# Patient Record
Sex: MALE | Race: WHITE | NOT HISPANIC OR LATINO | URBAN - METROPOLITAN AREA
[De-identification: names, ages, dates, MRNs, and addresses within clinical notes are randomized per-mention and may not be internally consistent; named-entity substitution may affect disease eponyms.]

---

## 2018-10-18 ENCOUNTER — OUTPATIENT (OUTPATIENT)
Dept: OUTPATIENT SERVICES | Facility: HOSPITAL | Age: 53
LOS: 1 days | End: 2018-10-18
Payer: COMMERCIAL

## 2018-10-18 VITALS
SYSTOLIC BLOOD PRESSURE: 110 MMHG | TEMPERATURE: 98 F | DIASTOLIC BLOOD PRESSURE: 68 MMHG | HEART RATE: 65 BPM | HEIGHT: 67.5 IN | WEIGHT: 132.94 LBS | OXYGEN SATURATION: 98 % | RESPIRATION RATE: 14 BRPM

## 2018-10-18 DIAGNOSIS — S83.249A OTHER TEAR OF MEDIAL MENISCUS, CURRENT INJURY, UNSPECIFIED KNEE, INITIAL ENCOUNTER: ICD-10-CM

## 2018-10-18 DIAGNOSIS — Z98.890 OTHER SPECIFIED POSTPROCEDURAL STATES: Chronic | ICD-10-CM

## 2018-10-18 LAB
BUN SERPL-MCNC: 16 MG/DL — SIGNIFICANT CHANGE UP (ref 7–23)
CALCIUM SERPL-MCNC: 9.3 MG/DL — SIGNIFICANT CHANGE UP (ref 8.4–10.5)
CHLORIDE SERPL-SCNC: 101 MMOL/L — SIGNIFICANT CHANGE UP (ref 98–107)
CO2 SERPL-SCNC: 28 MMOL/L — SIGNIFICANT CHANGE UP (ref 22–31)
CREAT SERPL-MCNC: 1.05 MG/DL — SIGNIFICANT CHANGE UP (ref 0.5–1.3)
GLUCOSE SERPL-MCNC: 95 MG/DL — SIGNIFICANT CHANGE UP (ref 70–99)
HCT VFR BLD CALC: 45.5 % — SIGNIFICANT CHANGE UP (ref 39–50)
HGB BLD-MCNC: 15.1 G/DL — SIGNIFICANT CHANGE UP (ref 13–17)
MCHC RBC-ENTMCNC: 28 PG — SIGNIFICANT CHANGE UP (ref 27–34)
MCHC RBC-ENTMCNC: 33.2 % — SIGNIFICANT CHANGE UP (ref 32–36)
MCV RBC AUTO: 84.4 FL — SIGNIFICANT CHANGE UP (ref 80–100)
NRBC # FLD: 0 — SIGNIFICANT CHANGE UP
PLATELET # BLD AUTO: 175 K/UL — SIGNIFICANT CHANGE UP (ref 150–400)
PMV BLD: 10.2 FL — SIGNIFICANT CHANGE UP (ref 7–13)
POTASSIUM SERPL-MCNC: 4.1 MMOL/L — SIGNIFICANT CHANGE UP (ref 3.5–5.3)
POTASSIUM SERPL-SCNC: 4.1 MMOL/L — SIGNIFICANT CHANGE UP (ref 3.5–5.3)
RBC # BLD: 5.39 M/UL — SIGNIFICANT CHANGE UP (ref 4.2–5.8)
RBC # FLD: 13.3 % — SIGNIFICANT CHANGE UP (ref 10.3–14.5)
SODIUM SERPL-SCNC: 141 MMOL/L — SIGNIFICANT CHANGE UP (ref 135–145)
WBC # BLD: 7.54 K/UL — SIGNIFICANT CHANGE UP (ref 3.8–10.5)
WBC # FLD AUTO: 7.54 K/UL — SIGNIFICANT CHANGE UP (ref 3.8–10.5)

## 2018-10-18 PROCEDURE — 93010 ELECTROCARDIOGRAM REPORT: CPT

## 2018-10-18 NOTE — H&P PST ADULT - ASSESSMENT
Other tear of medial meniscus, current injury, unspecified knee, initial encouter- Patient is scheduled for left knee arthroscopy scheduled on 10/30/2018.

## 2018-10-18 NOTE — H&P PST ADULT - NEGATIVE ALLERGY TYPES
no reactions to medicines/no outdoor environmental allergies/no indoor environmental allergies/no reactions to food/no reactions to insect bites/no reactions to animals

## 2018-10-18 NOTE — H&P PST ADULT - PROBLEM SELECTOR PLAN 1
Patient is scheduled for left knee arthroscopy scheduled on 10/30/2018.   Preop instructions, pepcid, surgical scrub provided. Pt stated understanding.  Pending labs.      MILI precautions, OR booking notified.

## 2018-10-18 NOTE — H&P PST ADULT - CIGARS, DURATION OF USE (MO/YR), PROFILE
Patient calling to report that he has a nonfunctioning Group 2 air mattress that [facility] to [facility]. He wanted documented in his chart in case the [facility] calls asking for our recommendations about what device he will need ordered that he does not want another Group 2. He has an adjustable height electric hospital bed with trapeze and would like to use a memory foam pressure relieving mattress    22 years

## 2018-10-18 NOTE — H&P PST ADULT - MUSCULOSKELETAL
details… detailed exam ROM intact/no joint warmth/no calf tenderness/no joint swelling/no joint erythema/normal strength

## 2018-10-18 NOTE — H&P PST ADULT - GASTROINTESTINAL DETAILS
no distention/no rebound tenderness/no guarding/no masses palpable/bowel sounds normal/soft/nontender

## 2018-10-18 NOTE — H&P PST ADULT - NEGATIVE MUSCULOSKELETAL SYMPTOMS
no muscle cramps/no muscle weakness/no neck pain/no leg pain R/no back pain/no leg pain L/no arm pain L/no arm pain R

## 2018-10-18 NOTE — H&P PST ADULT - NEGATIVE OPHTHALMOLOGIC SYMPTOMS
no irritation R/no blurred vision L/no pain R/no loss of vision L/no discharge L/no irritation L/no loss of vision R/no blurred vision R/no pain L/no diplopia/no photophobia/no discharge R

## 2018-10-18 NOTE — H&P PST ADULT - NEGATIVE GENERAL GENITOURINARY SYMPTOMS
normal urinary frequency/no nocturia/no urinary hesitancy/no hematuria/no urine discoloration/no bladder infections/no dysuria/no flank pain R/no flank pain L/no incontinence

## 2018-10-18 NOTE — H&P PST ADULT - NEGATIVE ENMT SYMPTOMS
no hearing difficulty/no ear pain/no abnormal taste sensation/no dysphagia/no tinnitus/no sinus symptoms/no nasal congestion/no nasal obstruction/no nose bleeds/no recurrent cold sores/no dry mouth/no vertigo/no gum bleeding/no throat pain/no nasal discharge/no post-nasal discharge

## 2018-10-18 NOTE — H&P PST ADULT - PMH
Basal cell carcinoma  Multiple areas for the past 10-12 years. Being followed by Dr. Almaraz.  Other tear of medial meniscus, current injury, unspecified knee, initial encounter

## 2018-10-18 NOTE — H&P PST ADULT - HISTORY OF PRESENT ILLNESS
53 year old male with a history of Basal cell Carcinoma, presents with complaints of Left knee pain since August 2018. Patient was referred to Dr. Crowder for an evaluation. Patient is s/p MRI of the left knee, resulted in tear of medial meniscus of the left knee. Patient is scheduled for left knee arthroscopy scheduled on 10/30/2018.

## 2018-10-18 NOTE — H&P PST ADULT - RS GEN PE MLT RESP DETAILS PC
breath sounds equal/respirations non-labored/good air movement/no rales/no wheezes/no rhonchi/clear to auscultation bilaterally/airway patent

## 2018-10-18 NOTE — H&P PST ADULT - NEGATIVE GENERAL SYMPTOMS
no sweating/no weight gain/no fever/no anorexia/no weight loss/no fatigue/no polyphagia/no polyuria/no polydipsia/no chills

## 2018-11-05 ENCOUNTER — TRANSCRIPTION ENCOUNTER (OUTPATIENT)
Age: 53
End: 2018-11-05

## 2018-11-06 ENCOUNTER — OUTPATIENT (OUTPATIENT)
Dept: OUTPATIENT SERVICES | Facility: HOSPITAL | Age: 53
LOS: 1 days | Discharge: HOME | End: 2018-11-06

## 2018-11-06 VITALS
HEIGHT: 67.5 IN | DIASTOLIC BLOOD PRESSURE: 77 MMHG | HEART RATE: 65 BPM | OXYGEN SATURATION: 98 % | WEIGHT: 132.94 LBS | TEMPERATURE: 97 F | SYSTOLIC BLOOD PRESSURE: 139 MMHG | RESPIRATION RATE: 16 BRPM

## 2018-11-06 VITALS — TEMPERATURE: 98 F

## 2018-11-06 DIAGNOSIS — Z98.890 OTHER SPECIFIED POSTPROCEDURAL STATES: Chronic | ICD-10-CM

## 2018-11-06 DIAGNOSIS — S83.249A OTHER TEAR OF MEDIAL MENISCUS, CURRENT INJURY, UNSPECIFIED KNEE, INITIAL ENCOUNTER: ICD-10-CM

## 2018-11-06 RX ORDER — ASPIRIN/CALCIUM CARB/MAGNESIUM 324 MG
81 TABLET ORAL ONCE
Qty: 0 | Refills: 0 | Status: DISCONTINUED | OUTPATIENT
Start: 2018-11-06 | End: 2018-11-21

## 2018-11-06 RX ORDER — IBUPROFEN 200 MG
600 TABLET ORAL EVERY 6 HOURS
Qty: 0 | Refills: 0 | Status: DISCONTINUED | OUTPATIENT
Start: 2018-11-06 | End: 2018-11-21

## 2018-11-06 RX ORDER — SODIUM CHLORIDE 9 MG/ML
1000 INJECTION INTRAMUSCULAR; INTRAVENOUS; SUBCUTANEOUS
Qty: 0 | Refills: 0 | Status: DISCONTINUED | OUTPATIENT
Start: 2018-11-06 | End: 2018-11-21

## 2018-11-06 RX ORDER — OXYCODONE AND ACETAMINOPHEN 5; 325 MG/1; MG/1
1 TABLET ORAL ONCE
Qty: 0 | Refills: 0 | Status: DISCONTINUED | OUTPATIENT
Start: 2018-11-06 | End: 2018-11-06

## 2018-11-06 RX ORDER — ASPIRIN/CALCIUM CARB/MAGNESIUM 324 MG
1 TABLET ORAL
Qty: 0 | Refills: 0 | DISCHARGE
Start: 2018-11-06

## 2018-11-06 RX ORDER — IBUPROFEN 200 MG
1 TABLET ORAL
Qty: 0 | Refills: 0 | DISCHARGE
Start: 2018-11-06

## 2018-11-06 NOTE — ASU DISCHARGE PLAN (ADULT/PEDIATRIC). - SPECIAL INSTRUCTIONS
Take Percocet for severe pain only. Can take up to 6 Tylenol 325 mg a day while taking Percocet. Alternate between taking Ibuprofen and Tylenol so you are taking pain medication every 3-4 hours if your pain is severe.    Narcotic pain medicine can cause extreme nausea and constipation. Drink plenty of water and take diuretics (colace, Miralax) as needed. You can get them from your local pharmacy.    It is important to ice and elevate your leg to keep swelling down and the pain manageable. Keep the ice on for 20 minutes, and then keep off for 20 minutes. Repeat while awake.

## 2018-11-06 NOTE — ASU DISCHARGE PLAN (ADULT/PEDIATRIC). - NOTIFY
Fever greater than 101/Pain not relieved by Medications/Numbness, color, or temperature change to extremity/Persistent Nausea and Vomiting/Bleeding that does not stop Bleeding that does not stop/Fever greater than 101/Persistent Nausea and Vomiting/Signs of infection: redness around surgical site, hot and tender to the touch, pus drainage of any kind accompanied by foul odor with a temperature of 101.0, or higher./Pain not relieved by Medications/Numbness, color, or temperature change to extremity

## 2018-11-06 NOTE — ASU DISCHARGE PLAN (ADULT/PEDIATRIC). - INSTRUCTIONS
Start with clear liquids and gradually increase your diet as you can, until you return to your normal diet. Do not eat too much too fast for today. Start with small meals, and what you can tolerate. Avoid eating anything greasy or spicy for today to avoid nausea. You may not have an appetite today, and that is normal due to anesthesia but drink plenty of fluids throughout the day.  You may or may not experience a sore throat after surgery. If you do experience a sore throat, it is likely from being intubated in the operating room. Sore throat will reside in 24-48 hours. You may drink room temperature fluids or use throat lozenges for comfort until sore throat resides.

## 2018-11-06 NOTE — BRIEF OPERATIVE NOTE - PROCEDURE
<<-----Click on this checkbox to enter Procedure Arthroscopic debridement of meniscus of left knee  11/06/2018    Active  [facility]

## 2018-11-06 NOTE — ASU DISCHARGE PLAN (ADULT/PEDIATRIC). - ACTIVITY LEVEL
weight bearing as tolerated/no sports/gym/no heavy lifting/elevate extremity weight bearing as tolerated/no sports/gym/no heavy lifting/Nothing heavier than 10 pounds./elevate extremity

## 2018-11-06 NOTE — ASU DISCHARGE PLAN (ADULT/PEDIATRIC). - BATHING
shower only After 24 hours until follow up with MD. Avoid tub baths/submerging incision in pool of water/shower only

## 2018-11-06 NOTE — ASU DISCHARGE PLAN (ADULT/PEDIATRIC). - NURSING INSTRUCTIONS
You were given IV Tylenol (1000mg) for pain management in the Operating Room.  Please do NOT take tylenol/acetaminophen products (Percocet, Vicodin, Excedrin) for the next 6-8 hours (after  _______ ). When taking pain/narcotic medications - take with food as it can cause nausea if taken on an empty stomach, and know it may cause constipation. To prevent constipation increase fluids and fiber in diet. You may take stool softeners (such as Colace) and follow directions as printed on bottle. - Do NOT drive while on narcotics.   You were given IV Toradol for pain management in the Operating Room. Please do NOT take Ibuprofen (NSAIDS) products (Motrin, Aleve, Advil) for the next 6-8 hours (After  _______). Please take ibuprophen with food to avoid gastrointestinal issues.   You were given IV antibiotics in the OR. IF you are prescribed oral antibiotics to take at home. Please follow directions on the bottle and start your first dose of antibiotics before bedtime, and schedule your own timing until you finish all antibiotics. DO NOT DISCONTINUE ON YOUR OWN.  Refer to medication reconcillation sheet attached with discharge instruction paperwork. You were given IV antibiotics in the OR. IF you are prescribed oral antibiotics to take at home. Please follow directions on the bottle and start your first dose of antibiotics before bedtime, and schedule your own timing until you finish all antibiotics. DO NOT DISCONTINUE ON YOUR OWN.   You were given oxycodone in the pre-op room prior to surgery. Next dose of any medication with oxycodone is 400PM.  When taking pain/narcotic medications - take with food as it can cause nausea if taken on an empty stomach, and know it may cause constipation. To prevent constipation increase fluids and fiber in diet. You may take stool softeners (such as Colace) and follow directions as printed on bottle. - Do NOT drive while on narcotics.   Refer to medication reconcillation sheet attached with discharge instruction paperwork.

## 2019-04-15 PROBLEM — C44.91 BASAL CELL CARCINOMA OF SKIN, UNSPECIFIED: Chronic | Status: ACTIVE | Noted: 2018-10-18

## 2019-04-15 PROBLEM — S83.249A OTHER TEAR OF MEDIAL MENISCUS, CURRENT INJURY, UNSPECIFIED KNEE, INITIAL ENCOUNTER: Chronic | Status: ACTIVE | Noted: 2018-10-18

## 2019-04-28 PROBLEM — Z00.00 ENCOUNTER FOR PREVENTIVE HEALTH EXAMINATION: Status: ACTIVE | Noted: 2019-04-28

## 2019-05-28 ENCOUNTER — APPOINTMENT (OUTPATIENT)
Dept: GASTROENTEROLOGY | Facility: CLINIC | Age: 54
End: 2019-05-28
Payer: COMMERCIAL

## 2019-05-28 VITALS
HEART RATE: 71 BPM | SYSTOLIC BLOOD PRESSURE: 129 MMHG | WEIGHT: 138 LBS | HEIGHT: 68 IN | BODY MASS INDEX: 20.92 KG/M2 | DIASTOLIC BLOOD PRESSURE: 77 MMHG

## 2019-05-28 DIAGNOSIS — Z85.828 PERSONAL HISTORY OF OTHER MALIGNANT NEOPLASM OF SKIN: ICD-10-CM

## 2019-05-28 DIAGNOSIS — Z12.11 ENCOUNTER FOR SCREENING FOR MALIGNANT NEOPLASM OF COLON: ICD-10-CM

## 2019-05-28 DIAGNOSIS — Z78.9 OTHER SPECIFIED HEALTH STATUS: ICD-10-CM

## 2019-05-28 DIAGNOSIS — Z80.7 FAMILY HISTORY OF OTHER MALIGNANT NEOPLASMS OF LYMPHOID, HEMATOPOIETIC AND RELATED TISSUES: ICD-10-CM

## 2019-05-28 PROCEDURE — 99244 OFF/OP CNSLTJ NEW/EST MOD 40: CPT

## 2019-05-28 NOTE — CONSULT LETTER
[Dear  ___] : Dear  [unfilled], [Sincerely,] : Sincerely, [Consult Letter:] : I had the pleasure of evaluating your patient, [unfilled]. [Please see my note below.] : Please see my note below. [FreeTextEntry3] : Chin Smith MD\par Department of Gastroenterology\par [facility]\par [address] [place] [address], [facility] [address]\[place] [address]\par Tel: [phone]\par Email: [email or web address]

## 2019-05-28 NOTE — HISTORY OF PRESENT ILLNESS
[Heartburn] : denies heartburn [Nausea] : denies nausea [Vomiting] : denies vomiting [Diarrhea] : denies diarrhea [Constipation] : denies constipation [Abdominal Swelling] : denies abdominal swelling [Abdominal Pain] : denies abdominal pain [Yellow Skin Or Eyes (Jaundice)] : denies jaundice [Rectal Pain] : denies rectal pain [de-identified] : Kalin presents to the office today for evaluation for colon cancer screening.  \par \par He feels well from a GI perspective, and denies any dysphagia, nausea, abdominal pain, change in bowel habits, GI bleeding, weight loss, or family history of colon cancer.  He normally moves his bowels once a day.  He has never had a colonoscopy before.  Both his parents  from lymphoma.  He smokes cigars daily.\par

## 2019-05-28 NOTE — ASSESSMENT
[FreeTextEntry1] : 1.  Encounter for colon cancer screening in average risk patient.  No previous colonoscopy.\par 2.  History of basal and squamous cell skin cancers status post excision.\par 3.  Systolic heart murmur.\par 4.  Hypercholesterolemia.\par 5.  Status post right knee surgery.\par 6.  Cigar smoker (daily).\par \par Recs:\par - Recent labs reviewed. CBC, CMP unremarkable.\par - Patient was advised to undergo colonoscopy- procedure, risks, benefits, and alternatives were explained. Patient agreeable. Brochure given. Miralax prep.\par - The patient was advised on cessation of tobacco products.\par - Low fat diet.

## 2019-06-21 ENCOUNTER — APPOINTMENT (OUTPATIENT)
Dept: GASTROENTEROLOGY | Facility: CLINIC | Age: 54
End: 2019-06-21
Payer: COMMERCIAL

## 2019-06-21 ENCOUNTER — LABORATORY RESULT (OUTPATIENT)
Age: 54
End: 2019-06-21

## 2019-06-21 PROCEDURE — 45380 COLONOSCOPY AND BIOPSY: CPT | Mod: 33

## 2020-08-10 ENCOUNTER — APPOINTMENT (OUTPATIENT)
Dept: CARDIOTHORACIC SURGERY | Facility: CLINIC | Age: 55
End: 2020-08-10
Payer: COMMERCIAL

## 2020-08-10 VITALS
SYSTOLIC BLOOD PRESSURE: 137 MMHG | HEIGHT: 68 IN | HEART RATE: 64 BPM | RESPIRATION RATE: 18 BRPM | OXYGEN SATURATION: 99 % | WEIGHT: 140 LBS | DIASTOLIC BLOOD PRESSURE: 86 MMHG | BODY MASS INDEX: 21.22 KG/M2 | TEMPERATURE: 97.9 F

## 2020-08-10 DIAGNOSIS — Z78.9 OTHER SPECIFIED HEALTH STATUS: ICD-10-CM

## 2020-08-10 PROCEDURE — 99244 OFF/OP CNSLTJ NEW/EST MOD 40: CPT

## 2020-08-10 NOTE — HISTORY OF PRESENT ILLNESS
[FreeTextEntry1] : Mr. Phoenix is a 55 year old male current everyday cigar smoker referred by Dr. Harman here today for initial evaluation of Severe Aortic Stenosis. Past medical history significant for Basal cell Carcinoma, Hyperlipidemia, Heart Murmur, Cervical Disc Disease, Microhematuria, Squamous Cell Carcinoma, and Severe Aortic Stenosis. Today he reports fatigue, shortness of breath and decreased energy level that has gotten worse over the past few months. He denies any chest pain, dizziness, palpitations, fevers, chills, nausea, vomiting, diarrhea, unintentional weight loss.\par \par The patient's past medical history, past surgical history, family history, social history, allergies, medications, and multisystem review of systems were individually reviewed with the patient.  There are no pertinent additions or subtractions.  The patient was personally seen and examined.\par

## 2020-08-10 NOTE — REVIEW OF SYSTEMS
[Feeling Poorly] : not feeling poorly [Feeling Tired] : not feeling tired [Eyesight Problems] : no eyesight problems [Discharge From Eyes] : no purulent discharge from the eyes [Nosebleeds] : no nosebleeds [Nasal Discharge] : no nasal discharge [Chest Pain] : no chest pain [Palpitations] : no palpitations [Cough] : no cough [SOB on Exertion] : no shortness of breath during exertion [Constipation] : no constipation [Diarrhea] : no diarrhea [Hesitancy] : no urinary hesitancy [Nocturia] : no nocturia [Joint Swelling] : no joint swelling [Joint Stiffness] : no joint stiffness [Itching] : no itching [Change In A Mole] : no change in a mole [Dizziness] : no dizziness [Fainting] : no fainting [Anxiety] : no anxiety [Depression] : no depression [Muscle Weakness] : no muscle weakness [Erectile Dysfunction] : no erectile dysfunction [Swollen Glands In The Neck] : no swollen glands in the neck [Swollen Glands] : no swollen glands [Negative] : Heme/Lymph

## 2020-08-10 NOTE — DATA REVIEWED
[FreeTextEntry1] : Echocardiogram performed on 07/06/2020 revealed at \par the aortic valve shows severe calcification with severe restriction. There is severe aortic valve stenosis. The peak velocity across the aortic valve is 3.9 m/s, the peak pressure gradient is 61 mmHg and the mean pressure gradient is 34 mmHg. The estimated aortic valve area is 0.80 cm2.\par

## 2020-08-10 NOTE — CONSULT LETTER
[Courtesy Letter:] : I had the pleasure of seeing your patient, [unfilled], in my office today. [Dear  ___] : Dear  [unfilled], [Referral Closing:] : Thank you very much for seeing this patient.  If you have any questions, please do not hesitate to contact me. [Sincerely,] : Sincerely, [Please see my note below.] : Please see my note below. [FreeTextEntry2] : Dr Harman [FreeTextEntry3] : Carlos Enrique Cruz MD\par Chief of Cardiovascular & Thoracic Surgery\par System Director of Endovascular & Cardiovascular Surgery\par \par Cardiovascular & Thoracic Surgery\par [facility]\par \par [facility] \par [address] \[place] [address]\par Tel   [phone]\par Fax  [phone]\par

## 2020-08-10 NOTE — PHYSICAL EXAM
[General Appearance - Alert] : alert [Sclera] : the sclera and conjunctiva were normal [Extraocular Movements] : extraocular movements were intact [General Appearance - Well Nourished] : well nourished [Neck Appearance] : the appearance of the neck was normal [Hearing Threshold Finger Rub Not [place]] : hearing was normal [Outer Ear] : the ears and nose were normal in appearance [Neck Cervical Mass (___cm)] : no neck mass was observed [Apical Impulse] : the apical impulse was normal [Exaggerated Use Of Accessory Muscles For Inspiration] : no accessory muscle use [Examination Of The Chest] : the chest was normal in appearance [2+] : left 2+ [Breast Palpation Mass] : no palpable masses [Bowel Sounds] : normal bowel sounds [Breast Appearance] : normal in appearance [Cervical Lymph Nodes Enlarged Posterior Bilaterally] : posterior cervical [Abdomen Tenderness] : non-tender [No CVA Tenderness] : no ~M costovertebral angle tenderness [Supraclavicular Lymph Nodes Enlarged Bilaterally] : supraclavicular [Abnormal Walk] : normal gait [Nail Clubbing] : no clubbing  or cyanosis of the fingernails [Skin Color & Pigmentation] : normal skin color and pigmentation [] : no rash [Sensation] : the sensory exam was normal to light touch and pinprick [Cranial Nerves] : cranial nerves 2-12 were intact [FreeTextEntry1] : defer

## 2020-08-10 NOTE — ASSESSMENT
[FreeTextEntry1] : The patient is a very pleasant 56 rule male. His cardiovascular risk factors include hypertension, hyperlipidemia, and prior tobacco abuse. The patient reports no longer smoking cigarettes, does smoke cigars daily. He states that he "tries not to inhaled." The patient was referred to Dr. Mayo with a heart murmur one year ago. Echocardiogram showed moderate aortic stenosis at that time. Repeat echocardiogram July 6, 2020 now show severe aortic valve stenosis with preserved ventricular function. The patient denies exertional dyspnea, the fascia reports feeling much more fatigued and tired. Denies chest pain. He denies a known history of bicuspid aortic valve disease. Furthermore, he denies family history of aortic aneurysm, ascending aortic dissection, or unexplained sudden death. He has no stigmata of Marfan's. He is now referred for consideration for aortic valve replacement.\par \par At 55 years of age, the patient is not a candidate for TAVR.  The patient did some investigating, and he was fully aware of this fact. Aortic valve replacement is indicated. Additional workup will include cardiac catheterization along with CTA of the chest to evaluate the ascending aorta and aortic root dimensions.\par \par Risks benefits and alternatives to surgical aortic valve replacement were discussed with the patient in detail.  Risks discussed included, but not limited to infection, bleeding, myocardial infarction, cerebrovascular accident, renal failure,  vascular injury requiring intervention, and death.  The patient fully understood and wishes to proceed.  All questions were answered to the patient's understanding and satisfaction.  We also discussed the benefits, alternatives, and disadvantages of both bioprosthetic and mechanical prostheses.  The patient chose a biologic prosthesis.  Furthermore, the patient's STS score and its significance were discussed directly with the patient and family.\par I would like to thank you for referring this patient to my attention and for allowing me to participate in his care.  If there are any further questions, or I can be of any further assistance, please do not hesitate contacting me at any time.\par \par \par PLAN:\par CT scan of the chest to measure and further evaluate the aorta valve\par [facility] cardiac Catherization \par All test in preparation for AVR possible CABG\par He will follow up with his dentist (requires letter from dentist).\par Echocardiogram\par \par Written by Chetan Antoine NP acting as a scribe for \par “The documentation recorded by the scribe accurately reflects the service I personally performed and the decisions made by me.” \par Nancy GARCIA.\par

## 2020-08-19 DIAGNOSIS — Z11.59 ENCOUNTER FOR SCREENING FOR OTHER VIRAL DISEASES: ICD-10-CM

## 2020-08-22 ENCOUNTER — OUTPATIENT (OUTPATIENT)
Dept: OUTPATIENT SERVICES | Facility: HOSPITAL | Age: 55
LOS: 1 days | End: 2020-08-22
Payer: COMMERCIAL

## 2020-08-22 ENCOUNTER — RESULT REVIEW (OUTPATIENT)
Age: 55
End: 2020-08-22

## 2020-08-22 ENCOUNTER — APPOINTMENT (OUTPATIENT)
Dept: CT IMAGING | Facility: CLINIC | Age: 55
End: 2020-08-22
Payer: COMMERCIAL

## 2020-08-22 ENCOUNTER — APPOINTMENT (OUTPATIENT)
Dept: ULTRASOUND IMAGING | Facility: CLINIC | Age: 55
End: 2020-08-22
Payer: COMMERCIAL

## 2020-08-22 DIAGNOSIS — Z98.890 OTHER SPECIFIED POSTPROCEDURAL STATES: Chronic | ICD-10-CM

## 2020-08-22 DIAGNOSIS — I35.0 NONRHEUMATIC AORTIC (VALVE) STENOSIS: ICD-10-CM

## 2020-08-22 PROCEDURE — 71275 CT ANGIOGRAPHY CHEST: CPT | Mod: 26

## 2020-08-22 PROCEDURE — 93880 EXTRACRANIAL BILAT STUDY: CPT

## 2020-08-22 PROCEDURE — 93880 EXTRACRANIAL BILAT STUDY: CPT | Mod: 26

## 2020-08-22 PROCEDURE — 71275 CT ANGIOGRAPHY CHEST: CPT

## 2020-08-24 ENCOUNTER — TRANSCRIPTION ENCOUNTER (OUTPATIENT)
Age: 55
End: 2020-08-24

## 2020-08-24 ENCOUNTER — APPOINTMENT (OUTPATIENT)
Dept: DISASTER EMERGENCY | Facility: CLINIC | Age: 55
End: 2020-08-24

## 2020-08-24 DIAGNOSIS — Z01.818 ENCOUNTER FOR OTHER PREPROCEDURAL EXAMINATION: ICD-10-CM

## 2020-08-25 LAB — SARS-COV-2 N GENE NPH QL NAA+PROBE: NOT DETECTED

## 2020-08-27 ENCOUNTER — APPOINTMENT (OUTPATIENT)
Dept: PULMONOLOGY | Facility: CLINIC | Age: 55
End: 2020-08-27
Payer: COMMERCIAL

## 2020-08-27 VITALS — BODY MASS INDEX: 19.15 KG/M2 | WEIGHT: 122 LBS | HEIGHT: 67 IN

## 2020-08-27 VITALS — WEIGHT: 132 LBS | BODY MASS INDEX: 20.67 KG/M2

## 2020-08-27 DIAGNOSIS — Z01.811 ENCOUNTER FOR PREPROCEDURAL RESPIRATORY EXAMINATION: ICD-10-CM

## 2020-08-27 PROCEDURE — 94010 BREATHING CAPACITY TEST: CPT

## 2020-08-27 PROCEDURE — 94729 DIFFUSING CAPACITY: CPT

## 2020-08-27 PROCEDURE — 94727 GAS DIL/WSHOT DETER LNG VOL: CPT

## 2020-08-27 PROCEDURE — 85018 HEMOGLOBIN: CPT | Mod: QW

## 2020-09-01 ENCOUNTER — APPOINTMENT (OUTPATIENT)
Dept: DISASTER EMERGENCY | Facility: CLINIC | Age: 55
End: 2020-09-01

## 2020-09-02 LAB — SARS-COV-2 N GENE NPH QL NAA+PROBE: NOT DETECTED

## 2020-09-03 VITALS — OXYGEN SATURATION: 98 % | SYSTOLIC BLOOD PRESSURE: 151 MMHG | DIASTOLIC BLOOD PRESSURE: 78 MMHG | HEART RATE: 54 BPM

## 2020-09-03 NOTE — H&P PST ADULT - NSICDXPASTMEDICALHX_GEN_ALL_CORE_FT
PAST MEDICAL HISTORY:  Basal cell carcinoma Multiple areas for the past 10-12 years. Being followed by Dr. Almaraz.    Other tear of medial meniscus, current injury, unspecified knee, initial encounter PAST MEDICAL HISTORY:  Aortic stenosis     Basal cell carcinoma Multiple areas for the past 10-12 years. Being followed by Dr. Almaraz.    Other tear of medial meniscus, current injury, unspecified knee, initial encounter

## 2020-09-03 NOTE — H&P PST ADULT - NSICDXPASTSURGICALHX_GEN_ALL_CORE_FT
PAST SURGICAL HISTORY:  History of surgery Basal cell carcinoma,areas included are: Neck, scalp, face, arms, chest, back

## 2020-09-03 NOTE — H&P PST ADULT - NSICDXFAMILYHX_GEN_ALL_CORE_FT
FAMILY HISTORY:  No pertinent family history in first degree relatives FAMILY HISTORY:  FH: lymphoma

## 2020-09-03 NOTE — H&P PST ADULT - ASSESSMENT
A 55 year old man presenting to [facility] for Right and LHC with Dr Roque secondary to severe AS & diagnostic workup for possible AVR/ CABG with Dr Cruz. Pt has a PMH of HTN, HLD, basal cell carcinoma.    PRE-PROCEDURE ASSESSMENT/ PLAN  -Right and LHC with Dr Roque this am  -NPO after midnight  -IV insert  -Labs reviewed  -Pre-procedure teaching   -consent   -Questions answered    ASA-  MALL-  eGFR-  Creat.  BRA- A 55 year old man presenting to [facility] area for Right and LHC with Dr Roque secondary to severe AS & diagnostic workup for possible AVR/ CABG with Dr Cruz. Pt has a PMH of HTN, HLD, basal cell carcinoma.    PRE-PROCEDURE ASSESSMENT/ PLAN  -Right and LHC with Dr Roque this am  -NPO after midnight  -IV insert  -Labs reviewed  -Pre-procedure teaching   -consent   -ASA given  -Questions answered    ASA-2  MALL-2  eGFR-90  Creat.0.95  BRA-1.8%

## 2020-09-03 NOTE — H&P PST ADULT - HISTORY OF PRESENT ILLNESS
Narrative: This is a 55 year old man presenting to [facility] area for Right and LHC with Dr Roque secondary to severe AS & diagnostic workup for possible AVR/ CABG with Dr Cruz. Pt has a PMH of HTN, HLD, basal cell carcinoma,    Symptoms:        Angina (Class):        Ischemic Symptoms:     Heart Failure:        Systolic/Diastolic/Combined:        NYHA Class (within 2 weeks):     Assessment of LVEF (Must be within 6 months):       EF:        Assessed by: TTE- severe AS- calcified aortic valve with severe restriction. diastolic dysfunction, trace mitral insuffiency. Overall left ventricular systolic function is normal. 	EF-66%       Date:  07/06/20    EKG-07/06/20-NSR HR 70    Prior Cardiac Interventions (LHC, stents, CABG):       PCI's (Date, Stents, Vessels):        CABG (Date, Grafts):     Noninvasive Testing:   Stress Test: Date:        Protocol:        Duration of Exercise:        Symptoms:        EKG Changes:        DTS:        Myocardial Imaging:        Risk Assessment (Low, Medium, High):     Echo (Date, Findings):     Antianginal Therapies:        Beta Blockers:         Calcium Channel Blockers:        Long Acting Nitrates:        Ranexa:     Associated Risk Factors:        Cerebrovascular Disease: N/A       Chronic Lung Disease: N/A       Peripheral Arterial Disease: N/A       Chronic Kidney Disease (if yes, what is GFR): N/A       Uncontrolled Diabetes (if yes, what is HgbA1C or FBS): N/A       Poorly Controlled Hypertension (if yes, what is SBP): N/A       Morbid Obesity (if yes, what is BMI): N/A       History of Recent Ventricular Arrhythmia: N/A       Inability to Ambulate Safely: N/A       Need for Therapeutic Anticoagulation: N/A       Antiplatelet or Contrast Allergy: N/A Narrative: This is a 55 year old man presenting to [facility] area for Right and LHC with Dr Roque secondary to severe AS & diagnostic workup for possible AVR/ CABG with Dr Cruz. Pt with c/o increasing fatigue and SOB.      Assessment of LVEF (Must be within 6 months):       EF:        Assessed by: TTE- severe AS- calcified aortic valve with severe restriction. diastolic dysfunction, trace mitral insuffiency. Overall left ventricular systolic function is normal. 	EF-66%       Date:  07/06/20    EKG-07/06/20-NSR HR 70    Prior Cardiac Interventions (LHC, stents, CABG):       PCI's (Date, Stents, Vessels): N/A       CABG (Date, Grafts): N/a    Noninvasive Testing:   Stress Test: Date:        Protocol:        Duration of Exercise:        Symptoms:        EKG Changes:        DTS:        Myocardial Imaging:        Risk Assessment (Low, Medium, High):     Echo (Date, Findings):     Antianginal Therapies:        Beta Blockers:         Calcium Channel Blockers:        Long Acting Nitrates:        Ranexa:     Associated Risk Factors:        Cerebrovascular Disease: N/A       Chronic Lung Disease: N/A       Peripheral Arterial Disease: N/A       Chronic Kidney Disease (if yes, what is GFR): N/A       Uncontrolled Diabetes (if yes, what is HgbA1C or FBS): N/A       Poorly Controlled Hypertension (if yes, what is SBP): N/A       Morbid Obesity (if yes, what is BMI): N/A       History of Recent Ventricular Arrhythmia: N/A       Inability to Ambulate Safely: N/A       Need for Therapeutic Anticoagulation: N/A       Antiplatelet or Contrast Allergy: N/A

## 2020-09-04 ENCOUNTER — TRANSCRIPTION ENCOUNTER (OUTPATIENT)
Age: 55
End: 2020-09-04

## 2020-09-04 ENCOUNTER — OUTPATIENT (OUTPATIENT)
Dept: OUTPATIENT SERVICES | Facility: HOSPITAL | Age: 55
LOS: 1 days | Discharge: HOME | End: 2020-09-04
Payer: COMMERCIAL

## 2020-09-04 VITALS
OXYGEN SATURATION: 98 % | SYSTOLIC BLOOD PRESSURE: 129 MMHG | HEART RATE: 58 BPM | DIASTOLIC BLOOD PRESSURE: 74 MMHG | RESPIRATION RATE: 16 BRPM

## 2020-09-04 DIAGNOSIS — I35.0 NONRHEUMATIC AORTIC (VALVE) STENOSIS: ICD-10-CM

## 2020-09-04 DIAGNOSIS — Z98.890 OTHER SPECIFIED POSTPROCEDURAL STATES: Chronic | ICD-10-CM

## 2020-09-04 LAB
ANION GAP SERPL CALC-SCNC: 11 MMOL/L — SIGNIFICANT CHANGE UP (ref 5–17)
APTT BLD: 34.3 SEC — SIGNIFICANT CHANGE UP (ref 27.5–35.5)
BUN SERPL-MCNC: 13 MG/DL — SIGNIFICANT CHANGE UP (ref 8–20)
CALCIUM SERPL-MCNC: 9.2 MG/DL — SIGNIFICANT CHANGE UP (ref 8.6–10.2)
CHLORIDE SERPL-SCNC: 99 MMOL/L — SIGNIFICANT CHANGE UP (ref 98–107)
CO2 SERPL-SCNC: 24 MMOL/L — SIGNIFICANT CHANGE UP (ref 22–29)
CREAT SERPL-MCNC: 0.95 MG/DL — SIGNIFICANT CHANGE UP (ref 0.5–1.3)
GLUCOSE SERPL-MCNC: 94 MG/DL — SIGNIFICANT CHANGE UP (ref 70–99)
HCT VFR BLD CALC: 49.9 % — SIGNIFICANT CHANGE UP (ref 39–50)
HGB BLD-MCNC: 16.8 G/DL — SIGNIFICANT CHANGE UP (ref 13–17)
INR BLD: 0.95 RATIO — SIGNIFICANT CHANGE UP (ref 0.88–1.16)
MAGNESIUM SERPL-MCNC: 2.1 MG/DL — SIGNIFICANT CHANGE UP (ref 1.6–2.6)
MCHC RBC-ENTMCNC: 28.3 PG — SIGNIFICANT CHANGE UP (ref 27–34)
MCHC RBC-ENTMCNC: 33.7 GM/DL — SIGNIFICANT CHANGE UP (ref 32–36)
MCV RBC AUTO: 84.1 FL — SIGNIFICANT CHANGE UP (ref 80–100)
PLATELET # BLD AUTO: 181 K/UL — SIGNIFICANT CHANGE UP (ref 150–400)
POTASSIUM SERPL-MCNC: 4.6 MMOL/L — SIGNIFICANT CHANGE UP (ref 3.5–5.3)
POTASSIUM SERPL-SCNC: 4.6 MMOL/L — SIGNIFICANT CHANGE UP (ref 3.5–5.3)
PROTHROM AB SERPL-ACNC: 11.1 SEC — SIGNIFICANT CHANGE UP (ref 10.6–13.6)
RBC # BLD: 5.93 M/UL — HIGH (ref 4.2–5.8)
RBC # FLD: 13.3 % — SIGNIFICANT CHANGE UP (ref 10.3–14.5)
SODIUM SERPL-SCNC: 134 MMOL/L — LOW (ref 135–145)
WBC # BLD: 5.23 K/UL — SIGNIFICANT CHANGE UP (ref 3.8–10.5)
WBC # FLD AUTO: 5.23 K/UL — SIGNIFICANT CHANGE UP (ref 3.8–10.5)

## 2020-09-04 PROCEDURE — 93010 ELECTROCARDIOGRAM REPORT: CPT

## 2020-09-04 PROCEDURE — 93456 R HRT CORONARY ARTERY ANGIO: CPT

## 2020-09-04 PROCEDURE — 85610 PROTHROMBIN TIME: CPT

## 2020-09-04 PROCEDURE — 93456 R HRT CORONARY ARTERY ANGIO: CPT | Mod: 26

## 2020-09-04 PROCEDURE — 80048 BASIC METABOLIC PNL TOTAL CA: CPT

## 2020-09-04 PROCEDURE — 85730 THROMBOPLASTIN TIME PARTIAL: CPT

## 2020-09-04 PROCEDURE — 99152 MOD SED SAME PHYS/QHP 5/>YRS: CPT

## 2020-09-04 PROCEDURE — 99153 MOD SED SAME PHYS/QHP EA: CPT

## 2020-09-04 PROCEDURE — C1894: CPT

## 2020-09-04 PROCEDURE — C1889: CPT

## 2020-09-04 PROCEDURE — C1769: CPT

## 2020-09-04 PROCEDURE — 83735 ASSAY OF MAGNESIUM: CPT

## 2020-09-04 PROCEDURE — 36415 COLL VENOUS BLD VENIPUNCTURE: CPT

## 2020-09-04 PROCEDURE — C1887: CPT

## 2020-09-04 PROCEDURE — 93005 ELECTROCARDIOGRAM TRACING: CPT

## 2020-09-04 PROCEDURE — 85027 COMPLETE CBC AUTOMATED: CPT

## 2020-09-04 RX ORDER — ASPIRIN/CALCIUM CARB/MAGNESIUM 324 MG
81 TABLET ORAL ONCE
Refills: 0 | Status: COMPLETED | OUTPATIENT
Start: 2020-09-04 | End: 2020-09-04

## 2020-09-04 RX ADMIN — Medication 81 MILLIGRAM(S): at 08:12

## 2020-09-04 NOTE — PROGRESS NOTE ADULT - SUBJECTIVE AND OBJECTIVE BOX
Nurse Practitioner Progress note:     INTERVAL HISTORY: 55 year old male in work up for AVR      TELEMETRY: SB 57 bpm    T(C): 36.5 (09-04-20 @ 07:58), Max: 36.5 (09-04-20 @ 07:58)  HR: 51 (09-04-20 @ 07:58) (51 - 54)  BP: 151/78 (09-04-20 @ 07:58) (151/78 - 151/78)  RR: 16 (09-04-20 @ 07:58) (16 - 16)  SpO2: 97% (09-04-20 @ 07:58) (97% - 98%)  Wt(kg): --    PHYSICAL EXAM:  Appearance: Normal	  Cardiovascular: Normal S1 S2, No JVD, No murmurs, No edema  Respiratory: Lungs clear to auscultation	  Psychiatry: A & O x 3, Mood & affect appropriate  Neurologic: Non-focal, A&O X3.  No neuro deficits  Procedure Site: Right radial band in place.  Right brachial. Sites benign.  No bleeding/hematoma/ecchymosis.     PROCEDURE RESULTS: S/P LHC which revealed normal coronaries and normal right heart pressures (via verbal report).  No intervention required.    ASSESSMENT/PLAN: 	  -Radial precautions  -D/C radial band in 1 hour  -Resume home meds  -Follow up with Dr. Cruz/Meghan  -Cleared for AVR surgery  -Discharge to home when criteria met

## 2020-09-04 NOTE — DISCHARGE NOTE PROVIDER - NSDCCPCAREPLAN_GEN_ALL_CORE_FT
PRINCIPAL DISCHARGE DIAGNOSIS  Diagnosis: Severe aortic stenosis  Assessment and Plan of Treatment: For AVR

## 2020-09-04 NOTE — DISCHARGE NOTE PROVIDER - CARE PROVIDER_API CALL
Narinder Calderon)  Cardiovascular Disease; Internal Medicine  [facility], [address] [place] [address]  Phone: [phone]  Fax: [phone]  Follow Up Time:     Carlos Enrique Cruz)  Surgery; Thoracic and Cardiac Surgery  [address] [place] [address]  Phone: [phone]  Fax: [phone]  Follow Up Time:

## 2020-09-04 NOTE — DISCHARGE NOTE NURSING/CASE MANAGEMENT/SOCIAL WORK - PATIENT PORTAL LINK FT
You can access the FollowMyHealth Patient Portal offered by [facility] by registering at the following website: [email or web address]. By joining [facility]’s FollowMyHealth portal, you will also be able to view your health information using other applications (apps) compatible with our system.

## 2020-09-04 NOTE — DISCHARGE NOTE PROVIDER - NSDCCPTREATMENT_GEN_ALL_CORE_FT
PRINCIPAL PROCEDURE  Procedure: Left heart cardiac cath  Findings and Treatment: normal coronary arteries

## 2020-09-04 NOTE — DISCHARGE NOTE PROVIDER - HOSPITAL COURSE
68 year old male with known severe AS in process of work up for AVR. Now s/p LHC/RHC with revealed normal coronaries and normal right heart pressures.

## 2020-09-09 PROBLEM — I35.0 NONRHEUMATIC AORTIC (VALVE) STENOSIS: Chronic | Status: ACTIVE | Noted: 2020-09-04

## 2020-10-15 ENCOUNTER — APPOINTMENT (OUTPATIENT)
Dept: CARDIOTHORACIC SURGERY | Facility: HOSPITAL | Age: 55
End: 2020-10-15

## 2020-12-21 PROBLEM — Z12.11 ENCOUNTER FOR SCREENING FOR MALIGNANT NEOPLASM OF COLON: Status: RESOLVED | Noted: 2019-05-28 | Resolved: 2020-12-21

## 2021-01-04 ENCOUNTER — OUTPATIENT (OUTPATIENT)
Dept: OUTPATIENT SERVICES | Facility: HOSPITAL | Age: 56
LOS: 1 days | End: 2021-01-04
Payer: COMMERCIAL

## 2021-01-04 ENCOUNTER — RESULT REVIEW (OUTPATIENT)
Age: 56
End: 2021-01-04

## 2021-01-04 VITALS
HEART RATE: 68 BPM | HEIGHT: 68 IN | WEIGHT: 138.23 LBS | TEMPERATURE: 98 F | RESPIRATION RATE: 16 BRPM | SYSTOLIC BLOOD PRESSURE: 130 MMHG | DIASTOLIC BLOOD PRESSURE: 82 MMHG

## 2021-01-04 DIAGNOSIS — I35.0 NONRHEUMATIC AORTIC (VALVE) STENOSIS: ICD-10-CM

## 2021-01-04 DIAGNOSIS — Z98.890 OTHER SPECIFIED POSTPROCEDURAL STATES: Chronic | ICD-10-CM

## 2021-01-04 DIAGNOSIS — Z01.818 ENCOUNTER FOR OTHER PREPROCEDURAL EXAMINATION: ICD-10-CM

## 2021-01-04 DIAGNOSIS — Q23.1 CONGENITAL INSUFFICIENCY OF AORTIC VALVE: ICD-10-CM

## 2021-01-04 DIAGNOSIS — I71.2 THORACIC AORTIC ANEURYSM, WITHOUT RUPTURE: ICD-10-CM

## 2021-01-04 DIAGNOSIS — Z29.9 ENCOUNTER FOR PROPHYLACTIC MEASURES, UNSPECIFIED: ICD-10-CM

## 2021-01-04 LAB
A1C WITH ESTIMATED AVERAGE GLUCOSE RESULT: 5.5 % — SIGNIFICANT CHANGE UP (ref 4–5.6)
ALBUMIN SERPL ELPH-MCNC: 4.4 G/DL — SIGNIFICANT CHANGE UP (ref 3.3–5.2)
ALP SERPL-CCNC: 76 U/L — SIGNIFICANT CHANGE UP (ref 40–120)
ALT FLD-CCNC: 14 U/L — SIGNIFICANT CHANGE UP
ANION GAP SERPL CALC-SCNC: 8 MMOL/L — SIGNIFICANT CHANGE UP (ref 5–17)
APPEARANCE UR: CLEAR — SIGNIFICANT CHANGE UP
APTT BLD: 35.4 SEC — SIGNIFICANT CHANGE UP (ref 27.5–35.5)
AST SERPL-CCNC: 23 U/L — SIGNIFICANT CHANGE UP
BACTERIA # UR AUTO: NEGATIVE — SIGNIFICANT CHANGE UP
BASOPHILS # BLD AUTO: 0.07 K/UL — SIGNIFICANT CHANGE UP (ref 0–0.2)
BASOPHILS NFR BLD AUTO: 1.3 % — SIGNIFICANT CHANGE UP (ref 0–2)
BILIRUB SERPL-MCNC: 0.5 MG/DL — SIGNIFICANT CHANGE UP (ref 0.4–2)
BILIRUB UR-MCNC: NEGATIVE — SIGNIFICANT CHANGE UP
BLD GP AB SCN SERPL QL: SIGNIFICANT CHANGE UP
BUN SERPL-MCNC: 15 MG/DL — SIGNIFICANT CHANGE UP (ref 8–20)
CALCIUM SERPL-MCNC: 9.3 MG/DL — SIGNIFICANT CHANGE UP (ref 8.6–10.2)
CHLORIDE SERPL-SCNC: 100 MMOL/L — SIGNIFICANT CHANGE UP (ref 98–107)
CO2 SERPL-SCNC: 27 MMOL/L — SIGNIFICANT CHANGE UP (ref 22–29)
COLOR SPEC: YELLOW — SIGNIFICANT CHANGE UP
CREAT SERPL-MCNC: 0.95 MG/DL — SIGNIFICANT CHANGE UP (ref 0.5–1.3)
DIFF PNL FLD: ABNORMAL
EOSINOPHIL # BLD AUTO: 0.21 K/UL — SIGNIFICANT CHANGE UP (ref 0–0.5)
EOSINOPHIL NFR BLD AUTO: 4 % — SIGNIFICANT CHANGE UP (ref 0–6)
EPI CELLS # UR: NEGATIVE — SIGNIFICANT CHANGE UP
ESTIMATED AVERAGE GLUCOSE: 111 MG/DL — SIGNIFICANT CHANGE UP (ref 68–114)
GLUCOSE SERPL-MCNC: 93 MG/DL — SIGNIFICANT CHANGE UP (ref 70–99)
GLUCOSE UR QL: NEGATIVE MG/DL — SIGNIFICANT CHANGE UP
HCT VFR BLD CALC: 51.8 % — HIGH (ref 39–50)
HGB BLD-MCNC: 17.1 G/DL — HIGH (ref 13–17)
IMM GRANULOCYTES NFR BLD AUTO: 0.2 % — SIGNIFICANT CHANGE UP (ref 0–1.5)
INR BLD: 0.96 RATIO — SIGNIFICANT CHANGE UP (ref 0.88–1.16)
KETONES UR-MCNC: NEGATIVE — SIGNIFICANT CHANGE UP
LEUKOCYTE ESTERASE UR-ACNC: ABNORMAL
LYMPHOCYTES # BLD AUTO: 1.83 K/UL — SIGNIFICANT CHANGE UP (ref 1–3.3)
LYMPHOCYTES # BLD AUTO: 34.8 % — SIGNIFICANT CHANGE UP (ref 13–44)
MCHC RBC-ENTMCNC: 28.2 PG — SIGNIFICANT CHANGE UP (ref 27–34)
MCHC RBC-ENTMCNC: 33 GM/DL — SIGNIFICANT CHANGE UP (ref 32–36)
MCV RBC AUTO: 85.3 FL — SIGNIFICANT CHANGE UP (ref 80–100)
MONOCYTES # BLD AUTO: 0.5 K/UL — SIGNIFICANT CHANGE UP (ref 0–0.9)
MONOCYTES NFR BLD AUTO: 9.5 % — SIGNIFICANT CHANGE UP (ref 2–14)
MRSA PCR RESULT.: SIGNIFICANT CHANGE UP
NEUTROPHILS # BLD AUTO: 2.64 K/UL — SIGNIFICANT CHANGE UP (ref 1.8–7.4)
NEUTROPHILS NFR BLD AUTO: 50.2 % — SIGNIFICANT CHANGE UP (ref 43–77)
NITRITE UR-MCNC: NEGATIVE — SIGNIFICANT CHANGE UP
NT-PROBNP SERPL-SCNC: 35 PG/ML — SIGNIFICANT CHANGE UP (ref 0–300)
PH UR: 7 — SIGNIFICANT CHANGE UP (ref 5–8)
PLATELET # BLD AUTO: 179 K/UL — SIGNIFICANT CHANGE UP (ref 150–400)
POTASSIUM SERPL-MCNC: 4.6 MMOL/L — SIGNIFICANT CHANGE UP (ref 3.5–5.3)
POTASSIUM SERPL-SCNC: 4.6 MMOL/L — SIGNIFICANT CHANGE UP (ref 3.5–5.3)
PREALB SERPL-MCNC: 26 MG/DL — SIGNIFICANT CHANGE UP (ref 18–38)
PROT SERPL-MCNC: 7.5 G/DL — SIGNIFICANT CHANGE UP (ref 6.6–8.7)
PROT UR-MCNC: NEGATIVE MG/DL — SIGNIFICANT CHANGE UP
PROTHROM AB SERPL-ACNC: 11.2 SEC — SIGNIFICANT CHANGE UP (ref 10.6–13.6)
RBC # BLD: 6.07 M/UL — HIGH (ref 4.2–5.8)
RBC # FLD: 13.3 % — SIGNIFICANT CHANGE UP (ref 10.3–14.5)
RBC CASTS # UR COMP ASSIST: SIGNIFICANT CHANGE UP /HPF (ref 0–4)
S AUREUS DNA NOSE QL NAA+PROBE: SIGNIFICANT CHANGE UP
SODIUM SERPL-SCNC: 135 MMOL/L — SIGNIFICANT CHANGE UP (ref 135–145)
SP GR SPEC: 1.01 — SIGNIFICANT CHANGE UP (ref 1.01–1.02)
T3 SERPL-MCNC: 156 NG/DL — SIGNIFICANT CHANGE UP (ref 80–200)
T4 AB SER-ACNC: 7.7 UG/DL — SIGNIFICANT CHANGE UP (ref 4.5–12)
TSH SERPL-MCNC: 2.15 UIU/ML — SIGNIFICANT CHANGE UP (ref 0.27–4.2)
UROBILINOGEN FLD QL: NEGATIVE MG/DL — SIGNIFICANT CHANGE UP
WBC # BLD: 5.26 K/UL — SIGNIFICANT CHANGE UP (ref 3.8–10.5)
WBC # FLD AUTO: 5.26 K/UL — SIGNIFICANT CHANGE UP (ref 3.8–10.5)
WBC UR QL: SIGNIFICANT CHANGE UP

## 2021-01-04 PROCEDURE — 93005 ELECTROCARDIOGRAM TRACING: CPT

## 2021-01-04 PROCEDURE — 71046 X-RAY EXAM CHEST 2 VIEWS: CPT

## 2021-01-04 PROCEDURE — 71046 X-RAY EXAM CHEST 2 VIEWS: CPT | Mod: 26

## 2021-01-04 PROCEDURE — G0463: CPT

## 2021-01-04 PROCEDURE — 93010 ELECTROCARDIOGRAM REPORT: CPT

## 2021-01-04 NOTE — H&P PST ADULT - ASSESSMENT
56 yo M    OPIOID RISK TOOL    SANDY EACH BOX THAT APPLIES AND ADD TOTALS AT THE END    FAMILY HISTORY OF SUBSTANCE ABUSE                 FEMALE         MALE                                                Alcohol                             [  ]1 pt          [  ]3pts                                               Illegal Durgs                     [  ]2 pts        [  ]3pts                                               Rx Drugs                           [  ]4 pts        [  ]4 pts    PERSONAL HISTORY OF SUBSTANCE ABUSE                                                                                          Alcohol                             [  ]3 pts       [  ]3 pts                                               Illegal Drugs                     [  ]4 pts        [  ]4 pts                                               Rx Drugs                           [  ]5 pts        [  ]5 pts    AGE BETWEEN 16-45 YEARS                                      [  ]1 pt         [  ]1 pt    HISTORY OF PREADOLESCENT   SEXUAL ABUSE                                                             [  ]3 pts        [  ]0pts    PSYCHOLOGICAL DISEASE                     ADD, OCD, Bipolar, Schizophrenia        [  ]2 pts         [  ]2 pts                      Depression                                               [  ]1 pt           [  ]1 pt           SCORING TOTAL   (add numbers and type here)              ( 0 )                                     A score of 3 or lower indicated LOW risk for future opioid abuse  A score of 4 to 7 indicated moderate risk for future opioid abuse  A score of 8 or higher indicates a high risk for opioid abuse    CAPRINI SCORE [CLOT]    AGE RELATED RISK FACTORS                                                       MOBILITY RELATED FACTORS  [ x] Age 41-60 years                                            (1 Point)                  [ ] Bed rest                                                        (1 Point)  [ ] Age: 61-74 years                                           (2 Points)                 [ ] Plaster cast                                                   (2 Points)  [ ] Age= 75 years                                              (3 Points)                 [ ] Bed bound for more than 72 hours                 (2 Points)    DISEASE RELATED RISK FACTORS                                               GENDER SPECIFIC FACTORS  [ ] Edema in the lower extremities                       (1 Point)                  [ ] Pregnancy                                                     (1 Point)  [ ] Varicose veins                                               (1 Point)                  [ ] Post-partum < 6 weeks                                   (1 Point)             [ ] BMI > 25 Kg/m2                                            (1 Point)                  [ ] Hormonal therapy  or oral contraception          (1 Point)                 [ ] Sepsis (in the previous month)                        (1 Point)                  [ ] History of pregnancy complications                 (1 point)  [ ] Pneumonia or serious lung disease                                               [ ] Unexplained or recurrent                     (1 Point)           (in the previous month)                               (1 Point)  [ ] Abnormal pulmonary function test                     (1 Point)                 SURGERY RELATED RISK FACTORS  [ ] Acute myocardial infarction                              (1 Point)                 [ ]  Section                                             (1 Point)  [ ] Congestive heart failure (in the previous month)  (1 Point)               [ ] Minor surgery                                                  (1 Point)   [ ] Inflammatory bowel disease                             (1 Point)                 [ ] Arthroscopic surgery                                        (2 Points)  [ ] Central venous access                                      (2 Points)                [x ] General surgery lasting more than 45 minutes   (2 Points)       [ ] Stroke (in the previous month)                          (5 Points)               [ ] Elective arthroplasty                                         (5 Points)                                                                                                                                               HEMATOLOGY RELATED FACTORS                                                 TRAUMA RELATED RISK FACTORS  [ ] Prior episodes of VTE                                     (3 Points)                [ ] Fracture of the hip, pelvis, or leg                       (5 Points)  [ ] Positive family history for VTE                         (3 Points)                 [ ] Acute spinal cord injury (in the previous month)  (5 Points)  [ ] Prothrombin 77263 A                                     (3 Points)                 [ ] Paralysis  (less than 1 month)                             (5 Points)  [ ] Factor V Leiden                                             (3 Points)                  [ ] Multiple Trauma within 1 month                        (5 Points)  [ ] Lupus anticoagulants                                     (3 Points)                                                           [ ] Anticardiolipin antibodies                               (3 Points)                                                       [ ] High homocysteine in the blood                      (3 Points)                                             [ ] Other congenital or acquired thrombophilia      (3 Points)                                                [ ] Heparin induced thrombocytopenia                  (3 Points)                                          Total Score [     3     ]    Caprini Score 0 - 2:  Low Risk, No VTE Prophylaxis required for most patients, encourage ambulation  Caprini Score 3 - 6:  At Risk, pharmacologic VTE prophylaxis is indicated for most patients (in the absence of a contraindication)  Caprini Score Greater than or = 7:  High Risk, pharmacologic VTE prophylaxis is indicated for most patients (in the absence of a contraindication) 56 yo M PMH of prior tobacco abuse, congenital bicuspid aortic valve. Pt reports he is no longer smoking cigarettes, does smoke 5 cigars weekly. Initially pt presented with a heart murmur one year ago. Echocardiogram showed moderate aortic stenosis at that time. Repeat echocardiogram 2020 showed severe aortic valve stenosis with preserved ventricular function. Pt c/o exertional dyspnea and fatigue. Denies chest pain, palpitations, dizziness, syncope, FHx of aortic aneurysm, ascending aortic dissection, or unexplained sudden death. Presents for preop assessment prior to aortic valve replacement, hemiarch ascending repair w/Dr Cruz 1/15    OPIOID RISK TOOL    SANDY EACH BOX THAT APPLIES AND ADD TOTALS AT THE END    FAMILY HISTORY OF SUBSTANCE ABUSE                 FEMALE         MALE                                                Alcohol                             [  ]1 pt          [  ]3pts                                               Illegal Durgs                     [  ]2 pts        [  ]3pts                                               Rx Drugs                           [  ]4 pts        [  ]4 pts    PERSONAL HISTORY OF SUBSTANCE ABUSE                                                                                          Alcohol                             [  ]3 pts       [  ]3 pts                                               Illegal Drugs                     [  ]4 pts        [  ]4 pts                                               Rx Drugs                           [  ]5 pts        [  ]5 pts    AGE BETWEEN 16-45 YEARS                                      [  ]1 pt         [  ]1 pt    HISTORY OF PREADOLESCENT   SEXUAL ABUSE                                                             [  ]3 pts        [  ]0pts    PSYCHOLOGICAL DISEASE                     ADD, OCD, Bipolar, Schizophrenia        [  ]2 pts         [  ]2 pts                      Depression                                               [  ]1 pt           [  ]1 pt           SCORING TOTAL   (add numbers and type here)              ( 0 )                                     A score of 3 or lower indicated LOW risk for future opioid abuse  A score of 4 to 7 indicated moderate risk for future opioid abuse  A score of 8 or higher indicates a high risk for opioid abuse    CAPRINI SCORE [CLOT]    AGE RELATED RISK FACTORS                                                       MOBILITY RELATED FACTORS  [ x] Age 41-60 years                                            (1 Point)                  [ ] Bed rest                                                        (1 Point)  [ ] Age: 61-74 years                                           (2 Points)                 [ ] Plaster cast                                                   (2 Points)  [ ] Age= 75 years                                              (3 Points)                 [ ] Bed bound for more than 72 hours                 (2 Points)    DISEASE RELATED RISK FACTORS                                               GENDER SPECIFIC FACTORS  [ ] Edema in the lower extremities                       (1 Point)                  [ ] Pregnancy                                                     (1 Point)  [ ] Varicose veins                                               (1 Point)                  [ ] Post-partum < 6 weeks                                   (1 Point)             [ ] BMI > 25 Kg/m2                                            (1 Point)                  [ ] Hormonal therapy  or oral contraception          (1 Point)                 [ ] Sepsis (in the previous month)                        (1 Point)                  [ ] History of pregnancy complications                 (1 point)  [ ] Pneumonia or serious lung disease                                               [ ] Unexplained or recurrent                     (1 Point)           (in the previous month)                               (1 Point)  [ ] Abnormal pulmonary function test                     (1 Point)                 SURGERY RELATED RISK FACTORS  [ ] Acute myocardial infarction                              (1 Point)                 [ ]  Section                                             (1 Point)  [ ] Congestive heart failure (in the previous month)  (1 Point)               [ ] Minor surgery                                                  (1 Point)   [ ] Inflammatory bowel disease                             (1 Point)                 [ ] Arthroscopic surgery                                        (2 Points)  [ ] Central venous access                                      (2 Points)                [x ] General surgery lasting more than 45 minutes   (2 Points)       [ ] Stroke (in the previous month)                          (5 Points)               [ ] Elective arthroplasty                                         (5 Points)                                                                                                                                               HEMATOLOGY RELATED FACTORS                                                 TRAUMA RELATED RISK FACTORS  [ ] Prior episodes of VTE                                     (3 Points)                [ ] Fracture of the hip, pelvis, or leg                       (5 Points)  [ ] Positive family history for VTE                         (3 Points)                 [ ] Acute spinal cord injury (in the previous month)  (5 Points)  [ ] Prothrombin 41041 A                                     (3 Points)                 [ ] Paralysis  (less than 1 month)                             (5 Points)  [ ] Factor V Leiden                                             (3 Points)                  [ ] Multiple Trauma within 1 month                        (5 Points)  [ ] Lupus anticoagulants                                     (3 Points)                                                           [ ] Anticardiolipin antibodies                               (3 Points)                                                       [ ] High homocysteine in the blood                      (3 Points)                                             [ ] Other congenital or acquired thrombophilia      (3 Points)                                                [ ] Heparin induced thrombocytopenia                  (3 Points)                                          Total Score [     3     ]    Caprini Score 0 - 2:  Low Risk, No VTE Prophylaxis required for most patients, encourage ambulation  Caprini Score 3 - 6:  At Risk, pharmacologic VTE prophylaxis is indicated for most patients (in the absence of a contraindication)  Caprini Score Greater than or = 7:  High Risk, pharmacologic VTE prophylaxis is indicated for most patients (in the absence of a contraindication)

## 2021-01-04 NOTE — H&P PST ADULT - NSICDXPASTSURGICALHX_GEN_ALL_CORE_FT
PAST SURGICAL HISTORY:  History of surgery Basal cell carcinoma,areas included are: Neck, scalp, face, arms, chest, back     PAST SURGICAL HISTORY:  History of meniscectomy of left knee 2017    History of surgery Basal cell carcinoma, areas included are: neck, scalp, face, arms, chest, back

## 2021-01-04 NOTE — H&P PST ADULT - NSICDXPROBLEM_GEN_ALL_CORE_FT
PROBLEM DIAGNOSES  Problem: Aortic stenosis  Assessment and Plan: preop assessment, aortic valve replacement, hemiarch ascending repair 1/15    Problem: Need for prophylactic measure  Assessment and Plan: caprini score 3, moderate risk for dvt SCD ordered, surgical team to assess for dvt prophylaxis        PROBLEM DIAGNOSES  Problem: Thoracic aortic aneurysm, without rupture  Assessment and Plan: preop assessment, hemiarch repair 1/15    Problem: Congenital bicuspid aortic valve  Assessment and Plan: preop assessment, aortic valve replacement 1/15    Problem: Aortic stenosis  Assessment and Plan: preop assessment, aortic valve replacement 1/15    Problem: Need for prophylactic measure  Assessment and Plan: caprini score 3, moderate risk for dvt SCD ordered, surgical team to assess for dvt prophylaxis

## 2021-01-04 NOTE — H&P PST ADULT - EKG AND INTERPRETATION
NSR, HR , official reading pending NSR, HR 62, nonspecific T wave abnormality, official reading pending

## 2021-01-04 NOTE — H&P PST ADULT - NSICDXPASTMEDICALHX_GEN_ALL_CORE_FT
PAST MEDICAL HISTORY:  Aortic stenosis     Basal cell carcinoma Multiple areas for the past 10-12 years. Being followed by Dr. Almaraz.    Congenital bicuspid aortic valve     Other tear of medial meniscus, current injury, unspecified knee, initial encounter     Thoracic aortic aneurysm, without rupture      PAST MEDICAL HISTORY:  Aortic stenosis     Basal cell carcinoma Multiple areas for the past 10-12 years. Being followed by Dr. Almaraz.    Congenital bicuspid aortic valve     Current smoker on some days 5 cogars/week    Other tear of medial meniscus, current injury, unspecified knee, initial encounter     Thoracic aortic aneurysm, without rupture

## 2021-01-04 NOTE — H&P PST ADULT - HISTORY OF PRESENT ILLNESS
56 yo M cardiovascular risk factors include hypertension, hyperlipidemia, and prior tobacco abuse. The patient reports no longer smoking cigarettes, does smoke cigars daily. He states that he "tries not to inhaled." The patient was referred to Dr. Mayo with a heart murmur one year ago. Echocardiogram showed moderate aortic stenosis at that time. Repeat echocardiogram July 6, 2020 now show severe aortic valve stenosis with preserved ventricular function. The patient denies exertional dyspnea, the fascia reports feeling much more fatigued and tired. Denies chest pain. He denies a known history of bicuspid aortic valve disease. Furthermore, he denies family history of aortic aneurysm, ascending aortic dissection, or unexplained sudden death. He has no stigmata of Marfan's.     cardiac catheterization along with CTA of the chest to evaluate the ascending aorta and aortic root dimensions.chose a biologic prosthesis.   CT scan of the chest to measure and further evaluate the aorta valve  [facility] cardiac Catherization   All test in preparation for AVR possible CABG  He will follow up with his dentist (requires letter from dentist).  Echocardiogram   54 yo M cardiovascular risk factors include hypertension, hyperlipidemia, and prior tobacco abuse. Pt reports he is no longer smoking cigarettes, does smoke 5 cigars weekly. Initially pt presented a heart murmur one year ago. Echocardiogram showed moderate aortic stenosis at that time. Repeat echocardiogram July 6, 2020 now show severe aortic valve stenosis with preserved ventricular function. Pt c/o exertional dyspnea and fatigue. Denies chest pain. He denies a known history of bicuspid aortic valve disease. Furthermore, he denies family history of aortic aneurysm, ascending aortic dissection, or unexplained sudden death. He has no stigmata of Marfan's.     cardiac catheterization along with CTA of the chest to evaluate the ascending aorta and aortic root dimensions.chose a biologic prosthesis.   CT scan of the chest to measure and further evaluate the aorta valve  [facility] cardiac Catherization   All test in preparation for AVR possible CABG  He will follow up with his dentist (requires letter from dentist).  Echocardiogram   56 yo M PMH of prior tobacco abuse, congenital bicuspid aortic valve. Pt reports he is no longer smoking cigarettes, does smoke 5 cigars weekly. Initially pt presented with a heart murmur one year ago. Echocardiogram showed moderate aortic stenosis at that time. Repeat echocardiogram July 6, 2020 showed severe aortic valve stenosis with preserved ventricular function. Pt c/o exertional dyspnea and fatigue. Denies chest pain, palpitations, dizziness, syncope, FHx of aortic aneurysm, ascending aortic dissection, or unexplained sudden death. Presents for preop assessment prior to aortic valve replacement, hemiarch ascending repair w/Dr Cruz 1/15

## 2021-01-05 LAB
CULTURE RESULTS: SIGNIFICANT CHANGE UP
SPECIMEN SOURCE: SIGNIFICANT CHANGE UP

## 2021-01-12 ENCOUNTER — APPOINTMENT (OUTPATIENT)
Dept: DISASTER EMERGENCY | Facility: CLINIC | Age: 56
End: 2021-01-12

## 2021-01-13 LAB — SARS-COV-2 N GENE NPH QL NAA+PROBE: NOT DETECTED

## 2021-01-14 ENCOUNTER — TRANSCRIPTION ENCOUNTER (OUTPATIENT)
Age: 56
End: 2021-01-14

## 2021-01-15 ENCOUNTER — RESULT REVIEW (OUTPATIENT)
Age: 56
End: 2021-01-15

## 2021-01-15 ENCOUNTER — INPATIENT (INPATIENT)
Facility: HOSPITAL | Age: 56
LOS: 5 days | Discharge: HOME HEALTH | DRG: 220 | End: 2021-01-21
Attending: THORACIC SURGERY (CARDIOTHORACIC VASCULAR SURGERY) | Admitting: THORACIC SURGERY (CARDIOTHORACIC VASCULAR SURGERY)
Payer: COMMERCIAL

## 2021-01-15 ENCOUNTER — APPOINTMENT (OUTPATIENT)
Dept: CARDIOTHORACIC SURGERY | Facility: HOSPITAL | Age: 56
End: 2021-01-15
Payer: COMMERCIAL

## 2021-01-15 VITALS
OXYGEN SATURATION: 99 % | WEIGHT: 138.23 LBS | TEMPERATURE: 98 F | SYSTOLIC BLOOD PRESSURE: 131 MMHG | DIASTOLIC BLOOD PRESSURE: 84 MMHG | RESPIRATION RATE: 16 BRPM | HEART RATE: 58 BPM | HEIGHT: 68 IN

## 2021-01-15 DIAGNOSIS — Z98.890 OTHER SPECIFIED POSTPROCEDURAL STATES: Chronic | ICD-10-CM

## 2021-01-15 DIAGNOSIS — I35.0 NONRHEUMATIC AORTIC (VALVE) STENOSIS: ICD-10-CM

## 2021-01-15 LAB
ABO RH CONFIRMATION: SIGNIFICANT CHANGE UP
ALBUMIN SERPL ELPH-MCNC: 3.1 G/DL — LOW (ref 3.3–5.2)
ALP SERPL-CCNC: 43 U/L — SIGNIFICANT CHANGE UP (ref 40–120)
ALT FLD-CCNC: 9 U/L — SIGNIFICANT CHANGE UP
ANION GAP SERPL CALC-SCNC: 9 MMOL/L — SIGNIFICANT CHANGE UP (ref 5–17)
APTT BLD: 33.1 SEC — SIGNIFICANT CHANGE UP (ref 27.5–35.5)
AST SERPL-CCNC: 23 U/L — SIGNIFICANT CHANGE UP
BASE EXCESS BLDV CALC-SCNC: -2 MMOL/L — SIGNIFICANT CHANGE UP (ref -2–2)
BILIRUB SERPL-MCNC: 0.5 MG/DL — SIGNIFICANT CHANGE UP (ref 0.4–2)
BUN SERPL-MCNC: 12 MG/DL — SIGNIFICANT CHANGE UP (ref 8–20)
CALCIUM SERPL-MCNC: 8 MG/DL — LOW (ref 8.6–10.2)
CHLORIDE SERPL-SCNC: 106 MMOL/L — SIGNIFICANT CHANGE UP (ref 98–107)
CO2 SERPL-SCNC: 22 MMOL/L — SIGNIFICANT CHANGE UP (ref 22–29)
CREAT SERPL-MCNC: 0.9 MG/DL — SIGNIFICANT CHANGE UP (ref 0.5–1.3)
GAS PNL BLDA: SIGNIFICANT CHANGE UP
GAS PNL BLDV: SIGNIFICANT CHANGE UP
GLUCOSE BLDC GLUCOMTR-MCNC: 124 MG/DL — HIGH (ref 70–99)
GLUCOSE BLDC GLUCOMTR-MCNC: 130 MG/DL — HIGH (ref 70–99)
GLUCOSE BLDC GLUCOMTR-MCNC: 132 MG/DL — HIGH (ref 70–99)
GLUCOSE BLDC GLUCOMTR-MCNC: 134 MG/DL — HIGH (ref 70–99)
GLUCOSE BLDC GLUCOMTR-MCNC: 152 MG/DL — HIGH (ref 70–99)
GLUCOSE BLDC GLUCOMTR-MCNC: 97 MG/DL — SIGNIFICANT CHANGE UP (ref 70–99)
GLUCOSE SERPL-MCNC: 160 MG/DL — HIGH (ref 70–99)
HCO3 BLDV-SCNC: 22 MMOL/L — SIGNIFICANT CHANGE UP (ref 21–29)
HCT VFR BLD CALC: 35.8 % — LOW (ref 39–50)
HGB BLD-MCNC: 12 G/DL — LOW (ref 13–17)
INR BLD: 1.37 RATIO — HIGH (ref 0.88–1.16)
MAGNESIUM SERPL-MCNC: 2.1 MG/DL — SIGNIFICANT CHANGE UP (ref 1.6–2.6)
MCHC RBC-ENTMCNC: 28.2 PG — SIGNIFICANT CHANGE UP (ref 27–34)
MCHC RBC-ENTMCNC: 33.5 GM/DL — SIGNIFICANT CHANGE UP (ref 32–36)
MCV RBC AUTO: 84 FL — SIGNIFICANT CHANGE UP (ref 80–100)
PCO2 BLDV: 37 MMHG — SIGNIFICANT CHANGE UP (ref 35–50)
PH BLDV: 7.4 — SIGNIFICANT CHANGE UP (ref 7.32–7.43)
PLATELET # BLD AUTO: 85 K/UL — LOW (ref 150–400)
PO2 BLDV: 47 MMHG — HIGH (ref 25–45)
POTASSIUM SERPL-MCNC: 3.8 MMOL/L — SIGNIFICANT CHANGE UP (ref 3.5–5.3)
POTASSIUM SERPL-SCNC: 3.8 MMOL/L — SIGNIFICANT CHANGE UP (ref 3.5–5.3)
PROT SERPL-MCNC: 4.4 G/DL — LOW (ref 6.6–8.7)
PROTHROM AB SERPL-ACNC: 15.7 SEC — HIGH (ref 10.6–13.6)
RBC # BLD: 4.26 M/UL — SIGNIFICANT CHANGE UP (ref 4.2–5.8)
RBC # FLD: 12.8 % — SIGNIFICANT CHANGE UP (ref 10.3–14.5)
SAO2 % BLDV: 84 % — SIGNIFICANT CHANGE UP
SODIUM SERPL-SCNC: 137 MMOL/L — SIGNIFICANT CHANGE UP (ref 135–145)
WBC # BLD: 11.46 K/UL — HIGH (ref 3.8–10.5)
WBC # FLD AUTO: 11.46 K/UL — HIGH (ref 3.8–10.5)

## 2021-01-15 PROCEDURE — 88305 TISSUE EXAM BY PATHOLOGIST: CPT | Mod: 26

## 2021-01-15 PROCEDURE — 88311 DECALCIFY TISSUE: CPT | Mod: 26

## 2021-01-15 PROCEDURE — 71045 X-RAY EXAM CHEST 1 VIEW: CPT | Mod: 26

## 2021-01-15 PROCEDURE — 33405 REPLACEMENT AORTIC VALVE OPN: CPT | Mod: AS

## 2021-01-15 PROCEDURE — 93010 ELECTROCARDIOGRAM REPORT: CPT

## 2021-01-15 PROCEDURE — 33405 REPLACEMENT AORTIC VALVE OPN: CPT

## 2021-01-15 RX ORDER — POLYETHYLENE GLYCOL 3350 17 G/17G
17 POWDER, FOR SOLUTION ORAL DAILY
Refills: 0 | Status: DISCONTINUED | OUTPATIENT
Start: 2021-01-17 | End: 2021-01-21

## 2021-01-15 RX ORDER — PANTOPRAZOLE SODIUM 20 MG/1
40 TABLET, DELAYED RELEASE ORAL DAILY
Refills: 0 | Status: DISCONTINUED | OUTPATIENT
Start: 2021-01-16 | End: 2021-01-21

## 2021-01-15 RX ORDER — CEFUROXIME AXETIL 250 MG
1500 TABLET ORAL EVERY 8 HOURS
Refills: 0 | Status: COMPLETED | OUTPATIENT
Start: 2021-01-15 | End: 2021-01-17

## 2021-01-15 RX ORDER — DEXTROSE 50 % IN WATER 50 %
50 SYRINGE (ML) INTRAVENOUS
Refills: 0 | Status: DISCONTINUED | OUTPATIENT
Start: 2021-01-15 | End: 2021-01-15

## 2021-01-15 RX ORDER — VANCOMYCIN HCL 1 G
1000 VIAL (EA) INTRAVENOUS EVERY 12 HOURS
Refills: 0 | Status: COMPLETED | OUTPATIENT
Start: 2021-01-15 | End: 2021-01-17

## 2021-01-15 RX ORDER — CHLORHEXIDINE GLUCONATE 213 G/1000ML
15 SOLUTION TOPICAL EVERY 12 HOURS
Refills: 0 | Status: DISCONTINUED | OUTPATIENT
Start: 2021-01-15 | End: 2021-01-15

## 2021-01-15 RX ORDER — CEFUROXIME AXETIL 250 MG
1500 TABLET ORAL ONCE
Refills: 0 | Status: DISCONTINUED | OUTPATIENT
Start: 2021-01-15 | End: 2021-01-15

## 2021-01-15 RX ORDER — POTASSIUM CHLORIDE 20 MEQ
10 PACKET (EA) ORAL
Refills: 0 | Status: DISCONTINUED | OUTPATIENT
Start: 2021-01-15 | End: 2021-01-15

## 2021-01-15 RX ORDER — SODIUM CHLORIDE 9 MG/ML
3 INJECTION INTRAMUSCULAR; INTRAVENOUS; SUBCUTANEOUS EVERY 8 HOURS
Refills: 0 | Status: DISCONTINUED | OUTPATIENT
Start: 2021-01-15 | End: 2021-01-15

## 2021-01-15 RX ORDER — CHLORHEXIDINE GLUCONATE 213 G/1000ML
1 SOLUTION TOPICAL DAILY
Refills: 0 | Status: DISCONTINUED | OUTPATIENT
Start: 2021-01-15 | End: 2021-01-18

## 2021-01-15 RX ORDER — FENTANYL CITRATE 50 UG/ML
50 INJECTION INTRAVENOUS
Refills: 0 | Status: DISCONTINUED | OUTPATIENT
Start: 2021-01-15 | End: 2021-01-15

## 2021-01-15 RX ORDER — INSULIN HUMAN 100 [IU]/ML
2 INJECTION, SOLUTION SUBCUTANEOUS
Qty: 50 | Refills: 0 | Status: DISCONTINUED | OUTPATIENT
Start: 2021-01-15 | End: 2021-01-16

## 2021-01-15 RX ORDER — ACETAMINOPHEN 500 MG
1000 TABLET ORAL ONCE
Refills: 0 | Status: COMPLETED | OUTPATIENT
Start: 2021-01-15 | End: 2021-01-15

## 2021-01-15 RX ORDER — SODIUM CHLORIDE 9 MG/ML
1000 INJECTION INTRAMUSCULAR; INTRAVENOUS; SUBCUTANEOUS
Refills: 0 | Status: DISCONTINUED | OUTPATIENT
Start: 2021-01-15 | End: 2021-01-18

## 2021-01-15 RX ORDER — ASPIRIN/CALCIUM CARB/MAGNESIUM 324 MG
81 TABLET ORAL DAILY
Refills: 0 | Status: DISCONTINUED | OUTPATIENT
Start: 2021-01-16 | End: 2021-01-21

## 2021-01-15 RX ORDER — DEXTROSE 50 % IN WATER 50 %
25 SYRINGE (ML) INTRAVENOUS
Refills: 0 | Status: DISCONTINUED | OUTPATIENT
Start: 2021-01-15 | End: 2021-01-15

## 2021-01-15 RX ORDER — POTASSIUM CHLORIDE 20 MEQ
10 PACKET (EA) ORAL
Refills: 0 | Status: COMPLETED | OUTPATIENT
Start: 2021-01-15 | End: 2021-01-15

## 2021-01-15 RX ORDER — PANTOPRAZOLE SODIUM 20 MG/1
40 TABLET, DELAYED RELEASE ORAL ONCE
Refills: 0 | Status: COMPLETED | OUTPATIENT
Start: 2021-01-15 | End: 2021-01-15

## 2021-01-15 RX ORDER — NICARDIPINE HYDROCHLORIDE 30 MG/1
5 CAPSULE, EXTENDED RELEASE ORAL
Qty: 40 | Refills: 0 | Status: DISCONTINUED | OUTPATIENT
Start: 2021-01-15 | End: 2021-01-16

## 2021-01-15 RX ORDER — KETOROLAC TROMETHAMINE 30 MG/ML
30 SYRINGE (ML) INJECTION EVERY 6 HOURS
Refills: 0 | Status: DISCONTINUED | OUTPATIENT
Start: 2021-01-15 | End: 2021-01-16

## 2021-01-15 RX ORDER — SODIUM CHLORIDE 9 MG/ML
500 INJECTION, SOLUTION INTRAVENOUS ONCE
Refills: 0 | Status: COMPLETED | OUTPATIENT
Start: 2021-01-15 | End: 2021-01-15

## 2021-01-15 RX ORDER — SENNA PLUS 8.6 MG/1
2 TABLET ORAL AT BEDTIME
Refills: 0 | Status: DISCONTINUED | OUTPATIENT
Start: 2021-01-15 | End: 2021-01-21

## 2021-01-15 RX ORDER — NOREPINEPHRINE BITARTRATE/D5W 8 MG/250ML
0.05 PLASTIC BAG, INJECTION (ML) INTRAVENOUS
Qty: 8 | Refills: 0 | Status: DISCONTINUED | OUTPATIENT
Start: 2021-01-15 | End: 2021-01-15

## 2021-01-15 RX ADMIN — PANTOPRAZOLE SODIUM 40 MILLIGRAM(S): 20 TABLET, DELAYED RELEASE ORAL at 13:10

## 2021-01-15 RX ADMIN — Medication 100 MILLIEQUIVALENT(S): at 13:40

## 2021-01-15 RX ADMIN — SENNA PLUS 2 TABLET(S): 8.6 TABLET ORAL at 21:10

## 2021-01-15 RX ADMIN — Medication 100 MILLIEQUIVALENT(S): at 12:30

## 2021-01-15 RX ADMIN — Medication 100 MILLIGRAM(S): at 17:06

## 2021-01-15 RX ADMIN — Medication 400 MILLIGRAM(S): at 17:08

## 2021-01-15 RX ADMIN — FENTANYL CITRATE 50 MICROGRAM(S): 50 INJECTION INTRAVENOUS at 12:30

## 2021-01-15 RX ADMIN — SODIUM CHLORIDE 500 MILLILITER(S): 9 INJECTION, SOLUTION INTRAVENOUS at 18:00

## 2021-01-15 RX ADMIN — Medication 30 MILLIGRAM(S): at 20:10

## 2021-01-15 RX ADMIN — Medication 250 MILLIGRAM(S): at 21:10

## 2021-01-15 RX ADMIN — Medication 100 MILLIEQUIVALENT(S): at 13:14

## 2021-01-15 NOTE — BRIEF OPERATIVE NOTE - NSICDXBRIEFPROCEDURE_GEN_ALL_CORE_FT
PROCEDURES:  Replacement, aortic valve, with ROGELIO 15-Ramon-2021 12:01:05 25mm inspiris valve Carlos Enrique Jules

## 2021-01-15 NOTE — BRIEF OPERATIVE NOTE - COMMENTS
No qualified resident was available to assist in this case. I have personally first assisted the Cardiothoracic Surgeon listed in this brief op note throughout the entirety of this case.

## 2021-01-16 DIAGNOSIS — F17.200 NICOTINE DEPENDENCE, UNSPECIFIED, UNCOMPLICATED: ICD-10-CM

## 2021-01-16 LAB
ALBUMIN SERPL ELPH-MCNC: 3.4 G/DL — SIGNIFICANT CHANGE UP (ref 3.3–5.2)
ALP SERPL-CCNC: 39 U/L — LOW (ref 40–120)
ALT FLD-CCNC: 14 U/L — SIGNIFICANT CHANGE UP
ANION GAP SERPL CALC-SCNC: 11 MMOL/L — SIGNIFICANT CHANGE UP (ref 5–17)
AST SERPL-CCNC: 51 U/L — HIGH
BILIRUB SERPL-MCNC: 0.4 MG/DL — SIGNIFICANT CHANGE UP (ref 0.4–2)
BUN SERPL-MCNC: 16 MG/DL — SIGNIFICANT CHANGE UP (ref 8–20)
CALCIUM SERPL-MCNC: 8 MG/DL — LOW (ref 8.6–10.2)
CHLORIDE SERPL-SCNC: 104 MMOL/L — SIGNIFICANT CHANGE UP (ref 98–107)
CO2 SERPL-SCNC: 22 MMOL/L — SIGNIFICANT CHANGE UP (ref 22–29)
CREAT SERPL-MCNC: 0.85 MG/DL — SIGNIFICANT CHANGE UP (ref 0.5–1.3)
GLUCOSE BLDC GLUCOMTR-MCNC: 122 MG/DL — HIGH (ref 70–99)
GLUCOSE BLDC GLUCOMTR-MCNC: 124 MG/DL — HIGH (ref 70–99)
GLUCOSE BLDC GLUCOMTR-MCNC: 127 MG/DL — HIGH (ref 70–99)
GLUCOSE BLDC GLUCOMTR-MCNC: 131 MG/DL — HIGH (ref 70–99)
GLUCOSE SERPL-MCNC: 135 MG/DL — HIGH (ref 70–99)
HCT VFR BLD CALC: 35.4 % — LOW (ref 39–50)
HGB BLD-MCNC: 11.9 G/DL — LOW (ref 13–17)
MAGNESIUM SERPL-MCNC: 1.8 MG/DL — SIGNIFICANT CHANGE UP (ref 1.6–2.6)
MCHC RBC-ENTMCNC: 28.5 PG — SIGNIFICANT CHANGE UP (ref 27–34)
MCHC RBC-ENTMCNC: 33.6 GM/DL — SIGNIFICANT CHANGE UP (ref 32–36)
MCV RBC AUTO: 84.9 FL — SIGNIFICANT CHANGE UP (ref 80–100)
PLATELET # BLD AUTO: 88 K/UL — LOW (ref 150–400)
POTASSIUM SERPL-MCNC: 4.4 MMOL/L — SIGNIFICANT CHANGE UP (ref 3.5–5.3)
POTASSIUM SERPL-SCNC: 4.4 MMOL/L — SIGNIFICANT CHANGE UP (ref 3.5–5.3)
PROT SERPL-MCNC: 5.2 G/DL — LOW (ref 6.6–8.7)
RBC # BLD: 4.17 M/UL — LOW (ref 4.2–5.8)
RBC # FLD: 13.2 % — SIGNIFICANT CHANGE UP (ref 10.3–14.5)
SODIUM SERPL-SCNC: 137 MMOL/L — SIGNIFICANT CHANGE UP (ref 135–145)
WBC # BLD: 10.81 K/UL — HIGH (ref 3.8–10.5)
WBC # FLD AUTO: 10.81 K/UL — HIGH (ref 3.8–10.5)

## 2021-01-16 PROCEDURE — 93010 ELECTROCARDIOGRAM REPORT: CPT

## 2021-01-16 PROCEDURE — 71045 X-RAY EXAM CHEST 1 VIEW: CPT | Mod: 26

## 2021-01-16 RX ORDER — DEXTROSE 50 % IN WATER 50 %
25 SYRINGE (ML) INTRAVENOUS ONCE
Refills: 0 | Status: DISCONTINUED | OUTPATIENT
Start: 2021-01-16 | End: 2021-01-17

## 2021-01-16 RX ORDER — SODIUM CHLORIDE 9 MG/ML
1000 INJECTION, SOLUTION INTRAVENOUS
Refills: 0 | Status: DISCONTINUED | OUTPATIENT
Start: 2021-01-16 | End: 2021-01-17

## 2021-01-16 RX ORDER — GLUCAGON INJECTION, SOLUTION 0.5 MG/.1ML
1 INJECTION, SOLUTION SUBCUTANEOUS ONCE
Refills: 0 | Status: DISCONTINUED | OUTPATIENT
Start: 2021-01-16 | End: 2021-01-18

## 2021-01-16 RX ORDER — MAGNESIUM SULFATE 500 MG/ML
2 VIAL (ML) INJECTION ONCE
Refills: 0 | Status: COMPLETED | OUTPATIENT
Start: 2021-01-16 | End: 2021-01-16

## 2021-01-16 RX ORDER — ALBUMIN HUMAN 25 %
250 VIAL (ML) INTRAVENOUS ONCE
Refills: 0 | Status: COMPLETED | OUTPATIENT
Start: 2021-01-16 | End: 2021-01-16

## 2021-01-16 RX ORDER — DEXTROSE 50 % IN WATER 50 %
12.5 SYRINGE (ML) INTRAVENOUS ONCE
Refills: 0 | Status: DISCONTINUED | OUTPATIENT
Start: 2021-01-16 | End: 2021-01-17

## 2021-01-16 RX ORDER — DEXTROSE 50 % IN WATER 50 %
15 SYRINGE (ML) INTRAVENOUS ONCE
Refills: 0 | Status: DISCONTINUED | OUTPATIENT
Start: 2021-01-16 | End: 2021-01-17

## 2021-01-16 RX ORDER — ENOXAPARIN SODIUM 100 MG/ML
40 INJECTION SUBCUTANEOUS DAILY
Refills: 0 | Status: DISCONTINUED | OUTPATIENT
Start: 2021-01-16 | End: 2021-01-21

## 2021-01-16 RX ORDER — INSULIN LISPRO 100/ML
VIAL (ML) SUBCUTANEOUS
Refills: 0 | Status: DISCONTINUED | OUTPATIENT
Start: 2021-01-16 | End: 2021-01-18

## 2021-01-16 RX ADMIN — ENOXAPARIN SODIUM 40 MILLIGRAM(S): 100 INJECTION SUBCUTANEOUS at 16:26

## 2021-01-16 RX ADMIN — Medication 30 MILLIGRAM(S): at 12:34

## 2021-01-16 RX ADMIN — Medication 125 MILLILITER(S): at 02:30

## 2021-01-16 RX ADMIN — CHLORHEXIDINE GLUCONATE 1 APPLICATION(S): 213 SOLUTION TOPICAL at 12:35

## 2021-01-16 RX ADMIN — Medication 30 MILLIGRAM(S): at 18:26

## 2021-01-16 RX ADMIN — Medication 250 MILLIGRAM(S): at 18:28

## 2021-01-16 RX ADMIN — Medication 250 MILLIGRAM(S): at 08:29

## 2021-01-16 RX ADMIN — Medication 100 MILLIGRAM(S): at 00:46

## 2021-01-16 RX ADMIN — Medication 100 MILLIGRAM(S): at 16:26

## 2021-01-16 RX ADMIN — Medication 100 MILLIGRAM(S): at 08:30

## 2021-01-16 RX ADMIN — Medication 30 MILLIGRAM(S): at 08:29

## 2021-01-16 RX ADMIN — Medication 81 MILLIGRAM(S): at 12:35

## 2021-01-16 RX ADMIN — Medication 30 MILLIGRAM(S): at 00:46

## 2021-01-16 RX ADMIN — SENNA PLUS 2 TABLET(S): 8.6 TABLET ORAL at 21:16

## 2021-01-16 RX ADMIN — Medication 50 GRAM(S): at 06:00

## 2021-01-16 RX ADMIN — PANTOPRAZOLE SODIUM 40 MILLIGRAM(S): 20 TABLET, DELAYED RELEASE ORAL at 12:34

## 2021-01-16 NOTE — PHYSICAL THERAPY INITIAL EVALUATION ADULT - PHYSICAL ASSIST/NONPHYSICAL ASSIST: STAND/SIT, REHAB EVAL
[facility] History & Physical


History and Physical


DATE OF ADMISSION: Jan 26, 2017 at 12:35





CHIEF COMPLAINT: "I think it was a reaction to my meds". Pt. states he had 

suicidal thoughts of overdosing on his meds. 





HISTORY OF THE PRESENT ILLNESS: Pt. states situational stressors "Which I don't 

want to get in to" also aggravated the potential med reaction. Pt. does not 

want to talk about the fact he was in residential rehab for cocaine use 

disorder. On admission to the ER pt's UDS was POS for cannabis and cocaine. Pt. 

also feels his lack of sleep made his depression and anxiety symptoms worse as 

well. 





PAST PSYCHIATRIC HISTORY: Pt. reports this is his seconf inpatient admission 

for psychiatric reasons. Pt. states 1 st admit was at age 19 yo when he 

attempted suicide while in [facility] by hanging himself with a bedsheet. Pt. states 

he has had some therapy in the past but has not stuck with a therapist for a 

consistent amount of time. Pt. states "I couldn't relate to them". 





FAMILY PSYCHIATRIC HISTORY: Pt. states MOM, sister, Maternal Grandma all have 

depression. 





PAST MEDICAL HISTORY: Low back pain, pt. denies any other health issue.





SOCIAL HISTORY:  Pt. is single, no kids, lives with father. Pt. is unemployed 

currently, is not going to school or volunteering.





ETOH/SUBSTANCE ABUSE HISTORY:  Pt. states his drugs of choice are "Weed" since 

age 15. I smoke it daily and do not plan on stopping any time soon. Pt. also 

likes to use Cocaine and has since age 19. 





HOME MEDICATIONS: Please see below. Unknown as pt. does not know exact names or 

dosages. Pt. states he stopped using prescribed meds before going in to rehab. 

Pt. states he was upset when rehab did not give him a stronger med(BENZO) to 

help with his anxiety, "So I just left". 





ALLERGIES: Please see below.





LEGAL HISTORY: Pt. states "I don't want to say".





VITAL SIGNS: Temperature 97.4, pulse 59, respiratory rate 16, blood pressure 112

/63.





LABORATORY DATA: Please see below. UDS on admission POS for Cannabis, Cocaine





MENTAL STATUS EXAMINATION: Patient is a 33 year-old male who appears older than 

stated age. Pt. is dressed in hospital scrubs and t shirt. Pt. is noted to have 

a steady gait.


Speech: Is pressured, circumstantial, of increased rate and normal volume. Pt. 

is articulate, coherent and spontaneous.


Language skills are intact.


Thought processes: Not clear, goal directed to go home only. Pt. bears no 

responsibility for his substance use, nor does he see a problem with it. 


Thought content: Irrational, illogical, tangential.


Abstract reasoning, and computation: Adequate.


Description of associations: Tangential, circumstantial.


Description of abnormal or psychotic thoughts: Pt. denies hallucinations, 

delusions, paranoia, obsessions, compulsions, homicidal or suicidal ideation. 

Pt. states he has been having false memories of dog bites. Does not understand 

why. 


Judgment: Poor.


Insight: Minimal, if any.


Orientation to person, place, time and surroundings.


Recent and remote memory: "Clearing" per pt. 


Attention span and concentration: Poor.


Language: Normal. Hyperverbose.


Fund of knowledge: Adequate.


Mood: "I am upset that I put myself in here, I should have just gone to my 

primary doctor".  Affect: Appropriate, flat, irrational at times in attempts to 

fabricate/ confabulate, anxious, under the influence??, manic.





DIAGNOSES:


1. Major depressive disorder, recurrent


2. Polysubstance use disorder





ASSESSMENT: Pt. is a tall, slender male in an apparent manic state, probably 

substance induced. Pt. disregards and refuses to consider any medication 

recommendations to help with his depression or anxiety. Pt. tells provider that 

he is "Just going to use my pot, not going to stop no matter what anyone says". 

Pt. does not want to answer with more than a "Yes or No". When provider asks 

specifics, pt. states "I don't want to get into that". 





PROBLEM LIST:


1. Risk for suicide


2. Substance Abuse


3. Ineffective coping


 


INITIAL TREATMENT PLAN: Pt. to continue Trazodone 150 mg po qhs prn for sleep, 

hydroxyzine hcl 50 mg po q 6 h prn for anxiety/agitation.  Maintain safety 

precautions, Patient to attend groups and participate in unit programming to 

develop effective coping strategies, Patient to be engage in the discharge 

planning process to ensure safe and effective discharge plan. Pt. to follow up 

with PCP upon discharge. Pt. to re-start therapy, medication management 

services upon discharge, pt. to consider substance abuse support group upon 

discharge. 





ESTIMATED LENGTH OF STAY: 4-6 days.





TIME SPENT COUNSELING AND COORDINATING INITIAL CARE: 50 minutes.





Medications


Scheduled


Paroxetine Hydrochloride (Paxil) 20 Mg Tab 20 MG PO DAILY anxiety/depression (

Reported) 


   PT STATES HE IS NOT TAKING- FILLED 1/12/17 





Scheduled PRN


Ibuprofen (Ibuprofen) 200 Mg Cap Unknown Dose PO DAILYPRN PRN PRN BACK PAIN (

Reported) 


Trazodone HCl (Trazodone HCl) 150 Mg Tab 150 MG PO QHSP PRN PRN INSOMNIA (

Reported) 





Allergies


Coded Allergies:  


     Bee Venom (Unverified  Allergy, Mild, 1/26/17)


     No Known Drug Allergy (Unverified  Allergy, Unknown, 1/26/17)








KERRY GOTTLIEB NP Jan 27, 2017 11:53 1 person + 1 person to manage equipment

## 2021-01-16 NOTE — PHYSICAL THERAPY INITIAL EVALUATION ADULT - GENERAL OBSERVATIONS, REHAB EVAL
Patient received lying in bed, NAD, breathing 2LO2 via NC, +right IJ line, +right radial line, +chest tube x2, +IV, +monitor, +villatoro, +SCDs. Pt agreeable to Physical Therapy evaluation.

## 2021-01-16 NOTE — PROGRESS NOTE ADULT - SUBJECTIVE AND OBJECTIVE BOX
Subjective:  56yo M resting comfortable, no c/o pain      HPI:  56 yo M PMH of prior tobacco abuse, congenital bicuspid aortic valve. Pt reports he is no longer smoking cigarettes, does smoke 5 cigars weekly. Initially pt presented with a heart murmur one year ago. Echocardiogram showed moderate aortic stenosis at that time. Repeat echocardiogram July 6, 2020 showed severe aortic valve stenosis with preserved ventricular function. Pt c/o exertional dyspnea and fatigue. Denies chest pain, palpitations, dizziness, syncope, FHx of aortic aneurysm, ascending aortic dissection, or unexplained sudden death. Presents for preop assessment prior to aortic valve replacement, hemiarch ascending repair w/Dr Cruz 1/15 (04 Jan 2021 11:22)          PAST MEDICAL & SURGICAL HISTORY:  Current smoker on some days  5 cogars/week    Congenital bicuspid aortic valve    Thoracic aortic aneurysm, without rupture    Aortic stenosis    Other tear of medial meniscus, current injury, unspecified knee, initial encounter    Basal cell carcinoma  Multiple areas for the past 10-12 years. Being followed by Dr. Almaraz.    History of meniscectomy of left knee  2017    History of surgery  Basal cell carcinoma, areas included are: neck, scalp, face, arms, chest, back            MEDICATIONS  (STANDING):  aspirin enteric coated 81 milliGRAM(s) Oral daily  cefuroxime  IVPB 1500 milliGRAM(s) IV Intermittent every 8 hours  chlorhexidine 2% Cloths 1 Application(s) Topical daily  dextrose 40% Gel 15 Gram(s) Oral once  dextrose 5%. 1000 milliLiter(s) (50 mL/Hr) IV Continuous <Continuous>  dextrose 5%. 1000 milliLiter(s) (100 mL/Hr) IV Continuous <Continuous>  dextrose 50% Injectable 25 Gram(s) IV Push once  dextrose 50% Injectable 12.5 Gram(s) IV Push once  dextrose 50% Injectable 25 Gram(s) IV Push once  glucagon  Injectable 1 milliGRAM(s) IntraMuscular once  insulin lispro (ADMELOG) corrective regimen sliding scale   SubCutaneous Before meals and at bedtime  ketorolac   Injectable 30 milliGRAM(s) IV Push every 6 hours  niCARdipine Infusion 5 mG/Hr (25 mL/Hr) IV Continuous <Continuous>  pantoprazole    Tablet 40 milliGRAM(s) Oral daily  senna 2 Tablet(s) Oral at bedtime  sodium chloride 0.9%. 1000 milliLiter(s) (10 mL/Hr) IV Continuous <Continuous>  sodium chloride 0.9%. 1000 milliLiter(s) (5 mL/Hr) IV Continuous <Continuous>  vancomycin  IVPB 1000 milliGRAM(s) IV Intermittent every 12 hours    MEDICATIONS  (PRN):          Allergies    No Known Allergies    Intolerances          WEIGHTS:  Daily Height in cm: 172.72 (15 Ramon 2021 05:43)    Daily   Admit Wt: Drug Dosing Weight  Height (cm): 172.7 (15 Ramon 2021 05:50)  Weight (kg): 62.7 (15 Ramon 2021 05:50)  BMI (kg/m2): 21 (15 Ramon 2021 05:50)  BSA (m2): 1.75 (15 Ramon 2021 05:50)  I&O's Summary    15 Ramon 2021 07:01  -  16 Jan 2021 03:36  --------------------------------------------------------  IN: 1514 mL / OUT: 1600 mL / NET: -86 mL        VITAL SIGNS:  ICU Vital Signs Last 24 Hrs  T(C): 37.3 (16 Jan 2021 03:00), Max: 37.7 (15 Ramon 2021 20:00)  T(F): 99.1 (16 Jan 2021 03:00), Max: 99.9 (15 Ramon 2021 20:00)  HR: 77 (16 Jan 2021 03:00) (58 - 95)  BP: 131/84 (15 Ramon 2021 05:43) (131/84 - 131/84)  BP(mean): --  ABP: 142/52 (16 Jan 2021 03:00) (99/49 - 149/68)  ABP(mean): 72 (16 Jan 2021 03:00) (63 - 90)  RR: 16 (16 Jan 2021 03:00) (10 - 27)  SpO2: 98% (16 Jan 2021 03:00) (94% - 100%)        All laboratory results, radiology and medications reviewed.    LABS:  01-16    137  |  104  |  16.0  ----------------------------<  135<H>  4.4   |  22.0  |  0.85    Ca    8.0<L>      16 Jan 2021 02:45  Mg     1.8     01-16    TPro  5.2<L>  /  Alb  3.4  /  TBili  0.4  /  DBili  x   /  AST  51<H>  /  ALT  14  /  AlkPhos  39<L>  01-16                                 11.9   10.81 )-----------( 88       ( 16 Jan 2021 02:45 )             35.4          PT/INR - ( 15 Ramon 2021 12:17 )   PT: 15.7 sec;   INR: 1.37 ratio         PTT - ( 15 Ramon 2021 12:17 )  PTT:33.1 sec  Bilirubin Total, Serum: 0.4 mg/dL (01-16 @ 02:45)  Bilirubin Total, Serum: 0.5 mg/dL (01-15 @ 12:17)    ABG - ( 15 Ramon 2021 20:19 )  pH, Arterial: 7.37  pH, Blood: x     /  pCO2: 41    /  pO2: 94    / HCO3: 23    / Base Excess: -1.7  /  SaO2: 98                    CAPILLARY BLOOD GLUCOSE      POCT Blood Glucose.: 134 mg/dL (15 Ramon 2021 23:04)  POCT Blood Glucose.: 132 mg/dL (15 Ramon 2021 21:05)  POCT Blood Glucose.: 124 mg/dL (15 Ramon 2021 20:00)  POCT Blood Glucose.: 97 mg/dL (15 Ramon 2021 18:49)  POCT Blood Glucose.: 130 mg/dL (15 Ramon 2021 16:58)  POCT Blood Glucose.: 152 mg/dL (15 Ramon 2021 15:01)             PHYSICAL EXAM:  General:  thin, no acute distress  Neurology:  alert and oriented X 2, nonfocal, no gross deficits  Respiratory:  clear to auscultation bilaterally  CV:  regular rate and rhythm, normal S1 S2  Abdomen:  soft, nontender, nondistended, positive bowel sounds  Extremities:  warm, well perfused, no edema +DP pulses  Incisions:  midline sternal incision, c/d/i, sternum stable

## 2021-01-16 NOTE — PHYSICAL THERAPY INITIAL EVALUATION ADULT - ADDITIONAL COMMENTS
Patient unable to provide history secondary to confusion. No history noted in chart. Will follow up.

## 2021-01-17 DIAGNOSIS — F10.239 ALCOHOL DEPENDENCE WITH WITHDRAWAL, UNSPECIFIED: ICD-10-CM

## 2021-01-17 LAB
ALBUMIN SERPL ELPH-MCNC: 3.5 G/DL — SIGNIFICANT CHANGE UP (ref 3.3–5.2)
ALP SERPL-CCNC: 35 U/L — LOW (ref 40–120)
ALT FLD-CCNC: 14 U/L — SIGNIFICANT CHANGE UP
ANION GAP SERPL CALC-SCNC: 10 MMOL/L — SIGNIFICANT CHANGE UP (ref 5–17)
AST SERPL-CCNC: 45 U/L — HIGH
BILIRUB DIRECT SERPL-MCNC: 0.1 MG/DL — SIGNIFICANT CHANGE UP (ref 0–0.3)
BILIRUB INDIRECT FLD-MCNC: 0.4 MG/DL — SIGNIFICANT CHANGE UP (ref 0.2–1)
BILIRUB SERPL-MCNC: 0.5 MG/DL — SIGNIFICANT CHANGE UP (ref 0.4–2)
BUN SERPL-MCNC: 25 MG/DL — HIGH (ref 8–20)
CALCIUM SERPL-MCNC: 8.4 MG/DL — LOW (ref 8.6–10.2)
CHLORIDE SERPL-SCNC: 104 MMOL/L — SIGNIFICANT CHANGE UP (ref 98–107)
CO2 SERPL-SCNC: 23 MMOL/L — SIGNIFICANT CHANGE UP (ref 22–29)
CREAT SERPL-MCNC: 0.83 MG/DL — SIGNIFICANT CHANGE UP (ref 0.5–1.3)
GAS PNL BLDA: SIGNIFICANT CHANGE UP
GLUCOSE BLDC GLUCOMTR-MCNC: 107 MG/DL — HIGH (ref 70–99)
GLUCOSE BLDC GLUCOMTR-MCNC: 110 MG/DL — HIGH (ref 70–99)
GLUCOSE BLDC GLUCOMTR-MCNC: 114 MG/DL — HIGH (ref 70–99)
GLUCOSE BLDC GLUCOMTR-MCNC: 133 MG/DL — HIGH (ref 70–99)
GLUCOSE SERPL-MCNC: 130 MG/DL — HIGH (ref 70–99)
HCT VFR BLD CALC: 32.6 % — LOW (ref 39–50)
HGB BLD-MCNC: 10.9 G/DL — LOW (ref 13–17)
MAGNESIUM SERPL-MCNC: 2.1 MG/DL — SIGNIFICANT CHANGE UP (ref 1.8–2.6)
MCHC RBC-ENTMCNC: 28.3 PG — SIGNIFICANT CHANGE UP (ref 27–34)
MCHC RBC-ENTMCNC: 33.4 GM/DL — SIGNIFICANT CHANGE UP (ref 32–36)
MCV RBC AUTO: 84.7 FL — SIGNIFICANT CHANGE UP (ref 80–100)
PHOSPHATE SERPL-MCNC: 1.9 MG/DL — LOW (ref 2.4–4.7)
PLATELET # BLD AUTO: 67 K/UL — LOW (ref 150–400)
POTASSIUM SERPL-MCNC: 4.3 MMOL/L — SIGNIFICANT CHANGE UP (ref 3.5–5.3)
POTASSIUM SERPL-SCNC: 4.3 MMOL/L — SIGNIFICANT CHANGE UP (ref 3.5–5.3)
PROT SERPL-MCNC: 5.3 G/DL — LOW (ref 6.6–8.7)
RBC # BLD: 3.85 M/UL — LOW (ref 4.2–5.8)
RBC # FLD: 13.3 % — SIGNIFICANT CHANGE UP (ref 10.3–14.5)
SODIUM SERPL-SCNC: 137 MMOL/L — SIGNIFICANT CHANGE UP (ref 135–145)
WBC # BLD: 11.05 K/UL — HIGH (ref 3.8–10.5)
WBC # FLD AUTO: 11.05 K/UL — HIGH (ref 3.8–10.5)

## 2021-01-17 PROCEDURE — 32551 INSERTION OF CHEST TUBE: CPT

## 2021-01-17 PROCEDURE — 71045 X-RAY EXAM CHEST 1 VIEW: CPT | Mod: 26,77

## 2021-01-17 PROCEDURE — 71045 X-RAY EXAM CHEST 1 VIEW: CPT | Mod: 26

## 2021-01-17 RX ORDER — ACETAMINOPHEN 500 MG
975 TABLET ORAL EVERY 6 HOURS
Refills: 0 | Status: DISCONTINUED | OUTPATIENT
Start: 2021-01-17 | End: 2021-01-21

## 2021-01-17 RX ORDER — MORPHINE SULFATE 50 MG/1
2 CAPSULE, EXTENDED RELEASE ORAL ONCE
Refills: 0 | Status: DISCONTINUED | OUTPATIENT
Start: 2021-01-17 | End: 2021-01-17

## 2021-01-17 RX ORDER — AMLODIPINE BESYLATE 2.5 MG/1
5 TABLET ORAL DAILY
Refills: 0 | Status: DISCONTINUED | OUTPATIENT
Start: 2021-01-17 | End: 2021-01-21

## 2021-01-17 RX ORDER — THIAMINE MONONITRATE (VIT B1) 100 MG
100 TABLET ORAL DAILY
Refills: 0 | Status: DISCONTINUED | OUTPATIENT
Start: 2021-01-17 | End: 2021-01-18

## 2021-01-17 RX ORDER — METOPROLOL TARTRATE 50 MG
25 TABLET ORAL
Refills: 0 | Status: DISCONTINUED | OUTPATIENT
Start: 2021-01-17 | End: 2021-01-21

## 2021-01-17 RX ORDER — HYDRALAZINE HCL 50 MG
10 TABLET ORAL ONCE
Refills: 0 | Status: COMPLETED | OUTPATIENT
Start: 2021-01-17 | End: 2021-01-17

## 2021-01-17 RX ORDER — OXYCODONE HYDROCHLORIDE 5 MG/1
5 TABLET ORAL EVERY 4 HOURS
Refills: 0 | Status: DISCONTINUED | OUTPATIENT
Start: 2021-01-17 | End: 2021-01-21

## 2021-01-17 RX ORDER — OXYCODONE HYDROCHLORIDE 5 MG/1
10 TABLET ORAL EVERY 4 HOURS
Refills: 0 | Status: DISCONTINUED | OUTPATIENT
Start: 2021-01-17 | End: 2021-01-21

## 2021-01-17 RX ADMIN — OXYCODONE HYDROCHLORIDE 10 MILLIGRAM(S): 5 TABLET ORAL at 21:11

## 2021-01-17 RX ADMIN — Medication 25 MILLIGRAM(S): at 16:49

## 2021-01-17 RX ADMIN — Medication 0.5 MILLIGRAM(S): at 11:55

## 2021-01-17 RX ADMIN — MORPHINE SULFATE 2 MILLIGRAM(S): 50 CAPSULE, EXTENDED RELEASE ORAL at 15:11

## 2021-01-17 RX ADMIN — Medication 250 MILLIGRAM(S): at 09:32

## 2021-01-17 RX ADMIN — ENOXAPARIN SODIUM 40 MILLIGRAM(S): 100 INJECTION SUBCUTANEOUS at 13:32

## 2021-01-17 RX ADMIN — Medication 100 MILLIGRAM(S): at 05:26

## 2021-01-17 RX ADMIN — OXYCODONE HYDROCHLORIDE 10 MILLIGRAM(S): 5 TABLET ORAL at 16:49

## 2021-01-17 RX ADMIN — Medication 0.5 MILLIGRAM(S): at 01:51

## 2021-01-17 RX ADMIN — Medication 100 MILLIGRAM(S): at 00:52

## 2021-01-17 RX ADMIN — Medication 81 MILLIGRAM(S): at 13:32

## 2021-01-17 RX ADMIN — AMLODIPINE BESYLATE 5 MILLIGRAM(S): 2.5 TABLET ORAL at 05:26

## 2021-01-17 RX ADMIN — Medication 100 MILLIGRAM(S): at 08:33

## 2021-01-17 RX ADMIN — CHLORHEXIDINE GLUCONATE 1 APPLICATION(S): 213 SOLUTION TOPICAL at 13:32

## 2021-01-17 RX ADMIN — SENNA PLUS 2 TABLET(S): 8.6 TABLET ORAL at 21:14

## 2021-01-17 RX ADMIN — Medication 10 MILLIGRAM(S): at 00:50

## 2021-01-17 RX ADMIN — PANTOPRAZOLE SODIUM 40 MILLIGRAM(S): 20 TABLET, DELAYED RELEASE ORAL at 13:32

## 2021-01-17 NOTE — PROGRESS NOTE ADULT - SUBJECTIVE AND OBJECTIVE BOX
Subjective:  56yo M agitated earlier with tremors, CIWA protocol initiated, ativan given.      HPI:  56 yo M PMH of prior tobacco abuse, congenital bicuspid aortic valve. Pt reports he is no longer smoking cigarettes, does smoke 5 cigars weekly. Initially pt presented with a heart murmur one year ago. Echocardiogram showed moderate aortic stenosis at that time. Repeat echocardiogram July 6, 2020 showed severe aortic valve stenosis with preserved ventricular function. Pt c/o exertional dyspnea and fatigue. Denies chest pain, palpitations, dizziness, syncope, FHx of aortic aneurysm, ascending aortic dissection, or unexplained sudden death. Presents for preop assessment prior to aortic valve replacement, hemiarch ascending repair w/Dr Cruz 1/15 (04 Jan 2021 11:22)          PAST MEDICAL & SURGICAL HISTORY:  Current smoker on some days  5 cogars/week    Congenital bicuspid aortic valve    Thoracic aortic aneurysm, without rupture    Aortic stenosis    Other tear of medial meniscus, current injury, unspecified knee, initial encounter    Basal cell carcinoma  Multiple areas for the past 10-12 years. Being followed by Dr. Almaraz.    History of meniscectomy of left knee  2017    History of surgery  Basal cell carcinoma, areas included are: neck, scalp, face, arms, chest, back            MEDICATIONS  (STANDING):  amLODIPine   Tablet 5 milliGRAM(s) Oral daily  aspirin enteric coated 81 milliGRAM(s) Oral daily  cefuroxime  IVPB 1500 milliGRAM(s) IV Intermittent every 8 hours  chlorhexidine 2% Cloths 1 Application(s) Topical daily  dextrose 40% Gel 15 Gram(s) Oral once  dextrose 5%. 1000 milliLiter(s) (50 mL/Hr) IV Continuous <Continuous>  dextrose 5%. 1000 milliLiter(s) (100 mL/Hr) IV Continuous <Continuous>  dextrose 50% Injectable 25 Gram(s) IV Push once  dextrose 50% Injectable 12.5 Gram(s) IV Push once  dextrose 50% Injectable 25 Gram(s) IV Push once  enoxaparin Injectable 40 milliGRAM(s) SubCutaneous daily  glucagon  Injectable 1 milliGRAM(s) IntraMuscular once  insulin lispro (ADMELOG) corrective regimen sliding scale   SubCutaneous Before meals and at bedtime  pantoprazole    Tablet 40 milliGRAM(s) Oral daily  senna 2 Tablet(s) Oral at bedtime  sodium chloride 0.9%. 1000 milliLiter(s) (10 mL/Hr) IV Continuous <Continuous>  sodium chloride 0.9%. 1000 milliLiter(s) (5 mL/Hr) IV Continuous <Continuous>  thiamine Injectable 100 milliGRAM(s) IV Push daily  vancomycin  IVPB 1000 milliGRAM(s) IV Intermittent every 12 hours    MEDICATIONS  (PRN):  polyethylene glycol 3350 17 Gram(s) Oral daily PRN Constipation          Allergies    No Known Allergies    Intolerances          WEIGHTS:  Daily     Daily   Admit Wt: Drug Dosing Weight  Height (cm): 172.7 (15 Ramon 2021 05:50)  Weight (kg): 62.7 (15 Ramon 2021 05:50)  BMI (kg/m2): 21 (15 Ramon 2021 05:50)  BSA (m2): 1.75 (15 Ramon 2021 05:50)  I&O's Summary    15 Ramon 2021 07:01  -  16 Jan 2021 07:00  --------------------------------------------------------  IN: 1889 mL / OUT: 1950 mL / NET: -61 mL    16 Jan 2021 07:01  -  17 Jan 2021 06:07  --------------------------------------------------------  IN: 1080 mL / OUT: 910 mL / NET: 170 mL        VITAL SIGNS:  ICU Vital Signs Last 24 Hrs  T(C): 36.9 (16 Jan 2021 15:56), Max: 36.9 (16 Jan 2021 15:56)  T(F): 98.4 (16 Jan 2021 15:56), Max: 98.4 (16 Jan 2021 15:56)  HR: 85 (17 Jan 2021 05:00) (61 - 91)  BP: 126/74 (17 Jan 2021 01:00) (126/74 - 179/95)  BP(mean): 93 (17 Jan 2021 01:00) (93 - 128)  ABP: 149/59 (17 Jan 2021 05:00) (3/14 - 195/78)  ABP(mean): 88 (17 Jan 2021 05:00) (3 - 116)  RR: 21 (17 Jan 2021 05:00) (10 - 31)  SpO2: 94% (17 Jan 2021 05:00) (85% - 98%)        All laboratory results, radiology and medications reviewed.    LABS:  01-17    137  |  104  |  25.0<H>  ----------------------------<  130<H>  4.3   |  23.0  |  0.83    Ca    8.4<L>      17 Jan 2021 02:37  Phos  1.9     01-17  Mg     2.1     01-17    TPro  5.3<L>  /  Alb  3.5  /  TBili  0.5  /  DBili  0.1  /  AST  45<H>  /  ALT  14  /  AlkPhos  35<L>  01-17                                 10.9   11.05 )-----------( 67       ( 17 Jan 2021 02:37 )             32.6          PT/INR - ( 15 Ramon 2021 12:17 )   PT: 15.7 sec;   INR: 1.37 ratio         PTT - ( 15 Ramon 2021 12:17 )  PTT:33.1 sec  Bilirubin Direct, Serum: 0.1 mg/dL (01-17 @ 02:37)  Bilirubin Total, Serum: 0.5 mg/dL (01-17 @ 02:37)    ABG - ( 15 Ramon 2021 20:19 )  pH, Arterial: 7.37  pH, Blood: x     /  pCO2: 41    /  pO2: 94    / HCO3: 23    / Base Excess: -1.7  /  SaO2: 98                    CAPILLARY BLOOD GLUCOSE      POCT Blood Glucose.: 131 mg/dL (16 Jan 2021 21:15)  POCT Blood Glucose.: 122 mg/dL (16 Jan 2021 16:46)  POCT Blood Glucose.: 127 mg/dL (16 Jan 2021 11:23)  POCT Blood Glucose.: 124 mg/dL (16 Jan 2021 08:01)             PHYSICAL EXAM:  General:  well nourished, + tremors  Neurology:  appropriate, word finding greatly improved last 24 hrs, + tremors, appears to be ETOH withdrawal  Respiratory:  clear to auscultation bilaterally  CV:  regular rate and rhythm, normal S1 S2  Abdomen:  soft, nontender, nondistended, positive bowel sounds  Extremities:  warm, well perfused, no edema +DP pulses  Incisions:  midline sternal incision, c/d/i, sternum stable

## 2021-01-17 NOTE — PROCEDURE NOTE - NSPROCDETAILS_GEN_ALL_CORE
guidewire recovered/lumen(s) aspirated and flushed/sterile dressing applied/sterile technique, catheter placed/ultrasound guidance with use of sterile gel and probe cove
Seldinger technique

## 2021-01-18 LAB
ANION GAP SERPL CALC-SCNC: 9 MMOL/L — SIGNIFICANT CHANGE UP (ref 5–17)
BUN SERPL-MCNC: 24 MG/DL — HIGH (ref 8–20)
CALCIUM SERPL-MCNC: 8 MG/DL — LOW (ref 8.6–10.2)
CHLORIDE SERPL-SCNC: 104 MMOL/L — SIGNIFICANT CHANGE UP (ref 98–107)
CO2 SERPL-SCNC: 25 MMOL/L — SIGNIFICANT CHANGE UP (ref 22–29)
CREAT SERPL-MCNC: 0.75 MG/DL — SIGNIFICANT CHANGE UP (ref 0.5–1.3)
GLUCOSE BLDC GLUCOMTR-MCNC: 124 MG/DL — HIGH (ref 70–99)
GLUCOSE SERPL-MCNC: 97 MG/DL — SIGNIFICANT CHANGE UP (ref 70–99)
HCT VFR BLD CALC: 31.9 % — LOW (ref 39–50)
HGB BLD-MCNC: 10.5 G/DL — LOW (ref 13–17)
MAGNESIUM SERPL-MCNC: 1.9 MG/DL — SIGNIFICANT CHANGE UP (ref 1.6–2.6)
MCHC RBC-ENTMCNC: 28 PG — SIGNIFICANT CHANGE UP (ref 27–34)
MCHC RBC-ENTMCNC: 32.9 GM/DL — SIGNIFICANT CHANGE UP (ref 32–36)
MCV RBC AUTO: 85.1 FL — SIGNIFICANT CHANGE UP (ref 80–100)
PHOSPHATE SERPL-MCNC: 2.2 MG/DL — LOW (ref 2.4–4.7)
PLATELET # BLD AUTO: 73 K/UL — LOW (ref 150–400)
POTASSIUM SERPL-MCNC: 4.1 MMOL/L — SIGNIFICANT CHANGE UP (ref 3.5–5.3)
POTASSIUM SERPL-SCNC: 4.1 MMOL/L — SIGNIFICANT CHANGE UP (ref 3.5–5.3)
RBC # BLD: 3.75 M/UL — LOW (ref 4.2–5.8)
RBC # FLD: 13.2 % — SIGNIFICANT CHANGE UP (ref 10.3–14.5)
SODIUM SERPL-SCNC: 138 MMOL/L — SIGNIFICANT CHANGE UP (ref 135–145)
WBC # BLD: 8.45 K/UL — SIGNIFICANT CHANGE UP (ref 3.8–10.5)
WBC # FLD AUTO: 8.45 K/UL — SIGNIFICANT CHANGE UP (ref 3.8–10.5)

## 2021-01-18 PROCEDURE — 99222 1ST HOSP IP/OBS MODERATE 55: CPT | Mod: 24

## 2021-01-18 PROCEDURE — 71045 X-RAY EXAM CHEST 1 VIEW: CPT | Mod: 26

## 2021-01-18 RX ORDER — FOLIC ACID 0.8 MG
1 TABLET ORAL DAILY
Refills: 0 | Status: DISCONTINUED | OUTPATIENT
Start: 2021-01-18 | End: 2021-01-21

## 2021-01-18 RX ORDER — SODIUM CHLORIDE 9 MG/ML
3 INJECTION INTRAMUSCULAR; INTRAVENOUS; SUBCUTANEOUS EVERY 8 HOURS
Refills: 0 | Status: DISCONTINUED | OUTPATIENT
Start: 2021-01-18 | End: 2021-01-21

## 2021-01-18 RX ORDER — THIAMINE MONONITRATE (VIT B1) 100 MG
100 TABLET ORAL DAILY
Refills: 0 | Status: DISCONTINUED | OUTPATIENT
Start: 2021-01-18 | End: 2021-01-21

## 2021-01-18 RX ORDER — MULTIVIT-MIN/FERROUS GLUCONATE 9 MG/15 ML
1 LIQUID (ML) ORAL DAILY
Refills: 0 | Status: DISCONTINUED | OUTPATIENT
Start: 2021-01-18 | End: 2021-01-21

## 2021-01-18 RX ORDER — MAGNESIUM SULFATE 500 MG/ML
1 VIAL (ML) INJECTION ONCE
Refills: 0 | Status: COMPLETED | OUTPATIENT
Start: 2021-01-18 | End: 2021-01-18

## 2021-01-18 RX ORDER — POTASSIUM PHOSPHATE, MONOBASIC POTASSIUM PHOSPHATE, DIBASIC 236; 224 MG/ML; MG/ML
15 INJECTION, SOLUTION INTRAVENOUS ONCE
Refills: 0 | Status: COMPLETED | OUTPATIENT
Start: 2021-01-18 | End: 2021-01-18

## 2021-01-18 RX ADMIN — PANTOPRAZOLE SODIUM 40 MILLIGRAM(S): 20 TABLET, DELAYED RELEASE ORAL at 11:25

## 2021-01-18 RX ADMIN — OXYCODONE HYDROCHLORIDE 10 MILLIGRAM(S): 5 TABLET ORAL at 20:48

## 2021-01-18 RX ADMIN — SENNA PLUS 2 TABLET(S): 8.6 TABLET ORAL at 21:26

## 2021-01-18 RX ADMIN — OXYCODONE HYDROCHLORIDE 10 MILLIGRAM(S): 5 TABLET ORAL at 04:57

## 2021-01-18 RX ADMIN — Medication 975 MILLIGRAM(S): at 11:26

## 2021-01-18 RX ADMIN — OXYCODONE HYDROCHLORIDE 5 MILLIGRAM(S): 5 TABLET ORAL at 08:59

## 2021-01-18 RX ADMIN — Medication 1 MILLIGRAM(S): at 11:30

## 2021-01-18 RX ADMIN — POTASSIUM PHOSPHATE, MONOBASIC POTASSIUM PHOSPHATE, DIBASIC 62.5 MILLIMOLE(S): 236; 224 INJECTION, SOLUTION INTRAVENOUS at 09:00

## 2021-01-18 RX ADMIN — AMLODIPINE BESYLATE 5 MILLIGRAM(S): 2.5 TABLET ORAL at 05:01

## 2021-01-18 RX ADMIN — Medication 81 MILLIGRAM(S): at 11:27

## 2021-01-18 RX ADMIN — SODIUM CHLORIDE 3 MILLILITER(S): 9 INJECTION INTRAMUSCULAR; INTRAVENOUS; SUBCUTANEOUS at 20:47

## 2021-01-18 RX ADMIN — Medication 25 MILLIGRAM(S): at 17:44

## 2021-01-18 RX ADMIN — Medication 1 TABLET(S): at 11:26

## 2021-01-18 RX ADMIN — SODIUM CHLORIDE 3 MILLILITER(S): 9 INJECTION INTRAMUSCULAR; INTRAVENOUS; SUBCUTANEOUS at 15:01

## 2021-01-18 RX ADMIN — Medication 100 MILLIGRAM(S): at 11:27

## 2021-01-18 RX ADMIN — Medication 1 GRAM(S): at 05:52

## 2021-01-18 RX ADMIN — Medication 25 MILLIGRAM(S): at 05:01

## 2021-01-18 NOTE — DIETITIAN NUTRITION RISK NOTIFICATION - ADDITIONAL COMMENTS/DIETITIAN RECOMMENDATIONS
d/c cons CHO from diet order to optimize po intake  Add Ensure Enlive TID to optimize po intake and provide an additional 350kcal, 20g protein per serving   Continue MVI, thiamine, folic acid daily

## 2021-01-18 NOTE — DIETITIAN NUTRITION RISK NOTIFICATION - TREATMENT: THE FOLLOWING DIET HAS BEEN RECOMMENDED
Diet, Regular:   Consistent Carbohydrate {No Snacks} (CSTCHO)  DASH/TLC {Sodium & Cholesterol Restricted} (DASH) (01-16-21 @ 10:28) [Active]

## 2021-01-18 NOTE — DIETITIAN INITIAL EVALUATION ADULT. - OTHER INFO
56 yo M PMH of prior tobacco abuse, congenital bicuspid aortic valve. Pt reports he is no longer smoking cigarettes, does smoke 5 cigars weekly. Initially pt presented with a heart murmur one year ago. Echocardiogram showed moderate aortic stenosis at that time. Repeat echocardiogram July 6, 2020 showed severe aortic valve stenosis with preserved ventricular function. Pt c/o exertional dyspnea and fatigue. Denies chest pain, palpitations, dizziness, syncope, FHx of aortic aneurysm, ascending aortic dissection, or unexplained sudden death. Presents for preop assessment prior to aortic valve replacement, hemiarch ascending repair w/Dr Cruz 1/15. Alcohol withdrawal- receiving thiamine. Patient on 100% NRB, s/p pigtail placement. Pt reported poor appetite and po intake. Encouraged small frequent meals and HBV protein.

## 2021-01-18 NOTE — DIETITIAN INITIAL EVALUATION ADULT. - SIGNS/SYMPTOMS
As evidenced by pt meeting <50% est energy needs x 5 days, mod muscle wasting and fat loss, 2+ edema

## 2021-01-18 NOTE — DIETITIAN INITIAL EVALUATION ADULT. - PERTINENT LABORATORY DATA
01-18 Na138 mmol/L Glu 97 mg/dL K+ 4.1 mmol/L Cr  0.75 mg/dL BUN 24.0 mg/dL<H> Phos 2.2 mg/dL<L> Alb n/a   PAB n/a

## 2021-01-18 NOTE — DIETITIAN INITIAL EVALUATION ADULT. - ETIOLOGY
related to inability to meet increased protein-energy needs in setting of alcohol withdrawal, tobacco abuse, poor appetite s/p procedure

## 2021-01-18 NOTE — DIETITIAN INITIAL EVALUATION ADULT. - ADD RECOMMEND
Add Ensure Enlive TID to optimize po intake and provide an additional 350kcal, 20g protein per serving. Continue MVI, thiamine, folic acid. Monitor wts and labs.

## 2021-01-18 NOTE — PROGRESS NOTE ADULT - SUBJECTIVE AND OBJECTIVE BOX
Brief Hospital Course: Patient on 100% NRB, s/p pigtail placement, lung appears up    Significant recent/past 24 hr events:    Subjective:    Review of Systems  no complaints   Patient is a 55y old  Male who presents with a chief complaint of "I have heart murmur" (04 Jan 2021 11:22)    HPI:  56 yo M PMH of prior tobacco abuse, congenital bicuspid aortic valve. Pt reports he is no longer smoking cigarettes, does smoke 5 cigars weekly. Initially pt presented with a heart murmur one year ago. Echocardiogram showed moderate aortic stenosis at that time. Repeat echocardiogram July 6, 2020 showed severe aortic valve stenosis with preserved ventricular function. Pt c/o exertional dyspnea and fatigue. Denies chest pain, palpitations, dizziness, syncope, FHx of aortic aneurysm, ascending aortic dissection, or unexplained sudden death. Presents for preop assessment prior to aortic valve replacement, hemiarch ascending repair w/Dr Cruz 1/15 (04 Jan 2021 11:22)    PAST MEDICAL & SURGICAL HISTORY:  Current smoker on some days  5 cogars/week    Congenital bicuspid aortic valve    Thoracic aortic aneurysm, without rupture    Aortic stenosis    Other tear of medial meniscus, current injury, unspecified knee, initial encounter    Basal cell carcinoma  Multiple areas for the past 10-12 years. Being followed by Dr. Almaraz.    History of meniscectomy of left knee  2017    History of surgery  Basal cell carcinoma, areas included are: neck, scalp, face, arms, chest, back      FAMILY HISTORY:  FH: lymphoma  mother, father        Vitals   ICU Vital Signs Last 24 Hrs  T(C): 36.4 (17 Jan 2021 23:00), Max: 37 (17 Jan 2021 19:00)  T(F): 97.5 (17 Jan 2021 23:00), Max: 98.6 (17 Jan 2021 19:00)  HR: 79 (18 Jan 2021 02:00) (79 - 95)  BP: 133/72 (18 Jan 2021 02:00) (109/64 - 145/81)  BP(mean): 95 (18 Jan 2021 02:00) (77 - 108)  ABP: 149/59 (17 Jan 2021 05:00) (133/51 - 149/59)  ABP(mean): 88 (17 Jan 2021 05:00) (74 - 88)  RR: 18 (18 Jan 2021 02:00) (17 - 39)  SpO2: 94% (18 Jan 2021 02:00) (85% - 98%)      VENT SETTINGS     ABG - ( 17 Jan 2021 20:55 )  pH, Arterial: 7.47  pH, Blood: x     /  pCO2: 36    /  pO2: 70    / HCO3: 27    / Base Excess: 2.6   /  SaO2: 95                  I&O's Detail    16 Jan 2021 07:01  -  17 Jan 2021 07:00  --------------------------------------------------------  IN:    Albumin 5%  - 250 mL: 250 mL    IV PiggyBack: 150 mL    IV PiggyBack: 250 mL    Oral Fluid: 100 mL    sodium chloride 0.9%: 120 mL    sodium chloride 0.9%: 240 mL  Total IN: 1110 mL    OUT:    Chest Tube (mL): 50 mL    Chest Tube (mL): 140 mL    Indwelling Catheter - Urethral (mL): 720 mL  Total OUT: 910 mL    Total NET: 200 mL      17 Jan 2021 07:01  -  18 Jan 2021 02:47  --------------------------------------------------------  IN:    Oral Fluid: 1410 mL  Total IN: 1410 mL    OUT:    Chest Tube (mL): 250 mL    Chest Tube (mL): 60 mL    Voided (mL): 850 mL  Total OUT: 1160 mL    Total NET: 250 mL          LABS                        10.9   11.05 )-----------( 67       ( 17 Jan 2021 02:37 )             32.6     01-17    137  |  104  |  25.0<H>  ----------------------------<  130<H>  4.3   |  23.0  |  0.83    Ca    8.4<L>      17 Jan 2021 02:37  Phos  1.9     01-17  Mg     2.1     01-17    TPro  5.3<L>  /  Alb  3.5  /  TBili  0.5  /  DBili  0.1  /  AST  45<H>  /  ALT  14  /  AlkPhos  35<L>  01-17              POCT Blood Glucose.: 133 mg/dL (01-17-21 @ 21:17)  POCT Blood Glucose.: 114 mg/dL (01-17-21 @ 16:42)  POCT Blood Glucose.: 107 mg/dL (01-17-21 @ 13:00)  POCT Blood Glucose.: 110 mg/dL (01-17-21 @ 07:50)        MEDICATIONS  (STANDING):  amLODIPine   Tablet 5 milliGRAM(s) Oral daily  aspirin enteric coated 81 milliGRAM(s) Oral daily  chlorhexidine 2% Cloths 1 Application(s) Topical daily  enoxaparin Injectable 40 milliGRAM(s) SubCutaneous daily  glucagon  Injectable 1 milliGRAM(s) IntraMuscular once  insulin lispro (ADMELOG) corrective regimen sliding scale   SubCutaneous Before meals and at bedtime  metoprolol tartrate 25 milliGRAM(s) Oral two times a day  pantoprazole    Tablet 40 milliGRAM(s) Oral daily  senna 2 Tablet(s) Oral at bedtime  sodium chloride 0.9%. 1000 milliLiter(s) (10 mL/Hr) IV Continuous <Continuous>  sodium chloride 0.9%. 1000 milliLiter(s) (5 mL/Hr) IV Continuous <Continuous>  thiamine Injectable 100 milliGRAM(s) IV Push daily    MEDICATIONS  (PRN):  acetaminophen   Tablet .. 975 milliGRAM(s) Oral every 6 hours PRN Mild Pain (1 - 3)  oxyCODONE    IR 5 milliGRAM(s) Oral every 4 hours PRN Moderate Pain (4 - 6)  oxyCODONE    IR 10 milliGRAM(s) Oral every 4 hours PRN Severe Pain (7 - 10)  polyethylene glycol 3350 17 Gram(s) Oral daily PRN Constipation    Allergies:  No Known Allergies      Physical Exam:   Constitutional: NAD, well-groomed, well-developed  HEENT: PERRLA, EOMI, no drainage or redness  Neck: supple,  No JVD  Respiratory: Breath Sounds equal & clear bilaterally to auscultation, no rales/rhonchi/wheezing, no accessory muscle use noted  Cardiovascular: Regular rate, regular rhythm, normal S1, S2; no murmurs or rub  Gastrointestinal: Soft, non-tender, non distended, + bowel sounds  Extremities: LIANG x 4, no peripheral edema, no cyanosis, no clubbing   Neurological: A+O x 3; speech clear and intact; no sensory, motor  deficits, normal reflexes  Skin: warm, dry, well perfused

## 2021-01-19 LAB
ANION GAP SERPL CALC-SCNC: 7 MMOL/L — SIGNIFICANT CHANGE UP (ref 5–17)
BUN SERPL-MCNC: 19 MG/DL — SIGNIFICANT CHANGE UP (ref 8–20)
CALCIUM SERPL-MCNC: 8.2 MG/DL — LOW (ref 8.6–10.2)
CHLORIDE SERPL-SCNC: 101 MMOL/L — SIGNIFICANT CHANGE UP (ref 98–107)
CO2 SERPL-SCNC: 28 MMOL/L — SIGNIFICANT CHANGE UP (ref 22–29)
CREAT SERPL-MCNC: 0.74 MG/DL — SIGNIFICANT CHANGE UP (ref 0.5–1.3)
GLUCOSE SERPL-MCNC: 144 MG/DL — HIGH (ref 70–99)
HCT VFR BLD CALC: 32.2 % — LOW (ref 39–50)
HGB BLD-MCNC: 10.7 G/DL — LOW (ref 13–17)
MAGNESIUM SERPL-MCNC: 1.9 MG/DL — SIGNIFICANT CHANGE UP (ref 1.6–2.6)
MCHC RBC-ENTMCNC: 28.4 PG — SIGNIFICANT CHANGE UP (ref 27–34)
MCHC RBC-ENTMCNC: 33.2 GM/DL — SIGNIFICANT CHANGE UP (ref 32–36)
MCV RBC AUTO: 85.4 FL — SIGNIFICANT CHANGE UP (ref 80–100)
PLATELET # BLD AUTO: 96 K/UL — LOW (ref 150–400)
POTASSIUM SERPL-MCNC: 3.4 MMOL/L — LOW (ref 3.5–5.3)
POTASSIUM SERPL-SCNC: 3.4 MMOL/L — LOW (ref 3.5–5.3)
RBC # BLD: 3.77 M/UL — LOW (ref 4.2–5.8)
RBC # FLD: 13.2 % — SIGNIFICANT CHANGE UP (ref 10.3–14.5)
SODIUM SERPL-SCNC: 136 MMOL/L — SIGNIFICANT CHANGE UP (ref 135–145)
WBC # BLD: 6.01 K/UL — SIGNIFICANT CHANGE UP (ref 3.8–10.5)
WBC # FLD AUTO: 6.01 K/UL — SIGNIFICANT CHANGE UP (ref 3.8–10.5)

## 2021-01-19 PROCEDURE — 71045 X-RAY EXAM CHEST 1 VIEW: CPT | Mod: 26

## 2021-01-19 RX ORDER — GABAPENTIN 400 MG/1
100 CAPSULE ORAL THREE TIMES A DAY
Refills: 0 | Status: DISCONTINUED | OUTPATIENT
Start: 2021-01-19 | End: 2021-01-21

## 2021-01-19 RX ORDER — POTASSIUM CHLORIDE 20 MEQ
10 PACKET (EA) ORAL
Refills: 0 | Status: COMPLETED | OUTPATIENT
Start: 2021-01-19 | End: 2021-01-19

## 2021-01-19 RX ORDER — LIDOCAINE 4 G/100G
1 CREAM TOPICAL DAILY
Refills: 0 | Status: DISCONTINUED | OUTPATIENT
Start: 2021-01-19 | End: 2021-01-21

## 2021-01-19 RX ORDER — MAGNESIUM SULFATE 500 MG/ML
2 VIAL (ML) INJECTION ONCE
Refills: 0 | Status: COMPLETED | OUTPATIENT
Start: 2021-01-19 | End: 2021-01-19

## 2021-01-19 RX ORDER — POTASSIUM CHLORIDE 20 MEQ
40 PACKET (EA) ORAL ONCE
Refills: 0 | Status: COMPLETED | OUTPATIENT
Start: 2021-01-19 | End: 2021-01-19

## 2021-01-19 RX ADMIN — GABAPENTIN 100 MILLIGRAM(S): 400 CAPSULE ORAL at 13:12

## 2021-01-19 RX ADMIN — SODIUM CHLORIDE 3 MILLILITER(S): 9 INJECTION INTRAMUSCULAR; INTRAVENOUS; SUBCUTANEOUS at 22:00

## 2021-01-19 RX ADMIN — Medication 100 MILLIGRAM(S): at 10:24

## 2021-01-19 RX ADMIN — Medication 1 TABLET(S): at 10:24

## 2021-01-19 RX ADMIN — OXYCODONE HYDROCHLORIDE 10 MILLIGRAM(S): 5 TABLET ORAL at 13:10

## 2021-01-19 RX ADMIN — LIDOCAINE 1 PATCH: 4 CREAM TOPICAL at 12:30

## 2021-01-19 RX ADMIN — Medication 25 MILLIGRAM(S): at 05:20

## 2021-01-19 RX ADMIN — SODIUM CHLORIDE 3 MILLILITER(S): 9 INJECTION INTRAMUSCULAR; INTRAVENOUS; SUBCUTANEOUS at 13:12

## 2021-01-19 RX ADMIN — AMLODIPINE BESYLATE 5 MILLIGRAM(S): 2.5 TABLET ORAL at 05:20

## 2021-01-19 RX ADMIN — ENOXAPARIN SODIUM 40 MILLIGRAM(S): 100 INJECTION SUBCUTANEOUS at 10:23

## 2021-01-19 RX ADMIN — SENNA PLUS 2 TABLET(S): 8.6 TABLET ORAL at 20:25

## 2021-01-19 RX ADMIN — GABAPENTIN 100 MILLIGRAM(S): 400 CAPSULE ORAL at 20:25

## 2021-01-19 RX ADMIN — SODIUM CHLORIDE 3 MILLILITER(S): 9 INJECTION INTRAMUSCULAR; INTRAVENOUS; SUBCUTANEOUS at 04:15

## 2021-01-19 RX ADMIN — GABAPENTIN 100 MILLIGRAM(S): 400 CAPSULE ORAL at 10:23

## 2021-01-19 RX ADMIN — Medication 40 MILLIEQUIVALENT(S): at 04:22

## 2021-01-19 RX ADMIN — PANTOPRAZOLE SODIUM 40 MILLIGRAM(S): 20 TABLET, DELAYED RELEASE ORAL at 10:24

## 2021-01-19 RX ADMIN — Medication 100 MILLIEQUIVALENT(S): at 05:22

## 2021-01-19 RX ADMIN — Medication 1 MILLIGRAM(S): at 10:24

## 2021-01-19 RX ADMIN — Medication 81 MILLIGRAM(S): at 10:23

## 2021-01-19 RX ADMIN — Medication 25 MILLIGRAM(S): at 17:54

## 2021-01-19 RX ADMIN — LIDOCAINE 1 PATCH: 4 CREAM TOPICAL at 20:24

## 2021-01-19 RX ADMIN — Medication 100 MILLIEQUIVALENT(S): at 04:22

## 2021-01-19 RX ADMIN — Medication 50 GRAM(S): at 04:21

## 2021-01-19 NOTE — PROGRESS NOTE ADULT - NEGATIVE CARDIOVASCULAR SYMPTOMS
no chest pain/no peripheral edema

## 2021-01-19 NOTE — PROGRESS NOTE ADULT - NEGATIVE GASTROINTESTINAL SYMPTOMS
no nausea/no vomiting/no diarrhea/no constipation

## 2021-01-19 NOTE — PROGRESS NOTE ADULT - SUBJECTIVE AND OBJECTIVE BOX
Subjective:  54yo M resting comfortable, no c/o pain.      HPI:  56 yo M PMH of prior tobacco abuse, congenital bicuspid aortic valve. Pt reports he is no longer smoking cigarettes, does smoke 5 cigars weekly. Initially pt presented with a heart murmur one year ago. Echocardiogram showed moderate aortic stenosis at that time. Repeat echocardiogram July 6, 2020 showed severe aortic valve stenosis with preserved ventricular function. Pt c/o exertional dyspnea and fatigue. Denies chest pain, palpitations, dizziness, syncope, FHx of aortic aneurysm, ascending aortic dissection, or unexplained sudden death. Presents for preop assessment prior to aortic valve replacement, hemiarch ascending repair w/Dr Cruz 1/15 (04 Jan 2021 11:22)          PAST MEDICAL & SURGICAL HISTORY:  Current smoker on some days  5 cogars/week    Congenital bicuspid aortic valve    Thoracic aortic aneurysm, without rupture    Aortic stenosis    Other tear of medial meniscus, current injury, unspecified knee, initial encounter    Basal cell carcinoma  Multiple areas for the past 10-12 years. Being followed by Dr. Almaraz.    History of meniscectomy of left knee  2017    History of surgery  Basal cell carcinoma, areas included are: neck, scalp, face, arms, chest, back            MEDICATIONS  (STANDING):  amLODIPine   Tablet 5 milliGRAM(s) Oral daily  aspirin enteric coated 81 milliGRAM(s) Oral daily  enoxaparin Injectable 40 milliGRAM(s) SubCutaneous daily  folic acid 1 milliGRAM(s) Oral daily  metoprolol tartrate 25 milliGRAM(s) Oral two times a day  multivitamin/minerals 1 Tablet(s) Oral daily  pantoprazole    Tablet 40 milliGRAM(s) Oral daily  senna 2 Tablet(s) Oral at bedtime  sodium chloride 0.9% lock flush 3 milliLiter(s) IV Push every 8 hours  thiamine 100 milliGRAM(s) Oral daily    MEDICATIONS  (PRN):  acetaminophen   Tablet .. 975 milliGRAM(s) Oral every 6 hours PRN Mild Pain (1 - 3)  oxyCODONE    IR 5 milliGRAM(s) Oral every 4 hours PRN Moderate Pain (4 - 6)  oxyCODONE    IR 10 milliGRAM(s) Oral every 4 hours PRN Severe Pain (7 - 10)  polyethylene glycol 3350 17 Gram(s) Oral daily PRN Constipation          Allergies    No Known Allergies    Intolerances          WEIGHTS:  Daily     Daily   Admit Wt: Drug Dosing Weight  Height (cm): 172.7 (15 Ramon 2021 05:50)  Weight (kg): 62.7 (15 Ramon 2021 05:50)  BMI (kg/m2): 21 (15 Ramon 2021 05:50)  BSA (m2): 1.75 (15 Ramon 2021 05:50)  I&O's Summary    17 Jan 2021 07:01  -  18 Jan 2021 07:00  --------------------------------------------------------  IN: 1530 mL / OUT: 1610 mL / NET: -80 mL    18 Jan 2021 07:01  -  19 Jan 2021 02:02  --------------------------------------------------------  IN: 840 mL / OUT: 1130 mL / NET: -290 mL        VITAL SIGNS:  ICU Vital Signs Last 24 Hrs  T(C): 36.9 (18 Jan 2021 20:00), Max: 37.2 (18 Jan 2021 12:00)  T(F): 98.4 (18 Jan 2021 20:00), Max: 99 (18 Jan 2021 12:00)  HR: 74 (19 Jan 2021 01:00) (66 - 90)  BP: 105/69 (19 Jan 2021 01:00) (98/56 - 136/85)  BP(mean): 82 (19 Jan 2021 01:00) (69 - 106)  ABP: --  ABP(mean): --  RR: 31 (19 Jan 2021 01:00) (20 - 42)  SpO2: 93% (19 Jan 2021 01:00) (88% - 96%)        All laboratory results, radiology and medications reviewed.    LABS:  01-18    138  |  104  |  24.0<H>  ----------------------------<  97  4.1   |  25.0  |  0.75    Ca    8.0<L>      18 Jan 2021 03:45  Phos  2.2     01-18  Mg     1.9     01-18    TPro  5.3<L>  /  Alb  3.5  /  TBili  0.5  /  DBili  0.1  /  AST  45<H>  /  ALT  14  /  AlkPhos  35<L>  01-17                                 10.5   8.45  )-----------( 73       ( 18 Jan 2021 03:45 )             31.9              ABG - ( 17 Jan 2021 20:55 )  pH, Arterial: 7.47  pH, Blood: x     /  pCO2: 36    /  pO2: 70    / HCO3: 27    / Base Excess: 2.6   /  SaO2: 95                    CAPILLARY BLOOD GLUCOSE      POCT Blood Glucose.: 124 mg/dL (18 Jan 2021 07:52)             PHYSICAL EXAM:  General:  well nourished, no acute distress  Neurology:  alert and oriented X 4, nonfocal, no gross deficits  Respiratory:  clear to auscultation bilaterally  CV:  regular rate and rhythm, normal S1 S2  Abdomen:  soft, nontender, nondistended, positive bowel sounds  Extremities:  warm, well perfused, no edema +DP pulses  Incisions:  midline sternal incision, c/d/i, sternum stable

## 2021-01-20 ENCOUNTER — TRANSCRIPTION ENCOUNTER (OUTPATIENT)
Age: 56
End: 2021-01-20

## 2021-01-20 ENCOUNTER — NON-APPOINTMENT (OUTPATIENT)
Age: 56
End: 2021-01-20

## 2021-01-20 LAB
ANION GAP SERPL CALC-SCNC: 8 MMOL/L — SIGNIFICANT CHANGE UP (ref 5–17)
BUN SERPL-MCNC: 17 MG/DL — SIGNIFICANT CHANGE UP (ref 8–20)
CALCIUM SERPL-MCNC: 8.4 MG/DL — LOW (ref 8.6–10.2)
CHLORIDE SERPL-SCNC: 103 MMOL/L — SIGNIFICANT CHANGE UP (ref 98–107)
CO2 SERPL-SCNC: 25 MMOL/L — SIGNIFICANT CHANGE UP (ref 22–29)
CREAT SERPL-MCNC: 0.77 MG/DL — SIGNIFICANT CHANGE UP (ref 0.5–1.3)
GLUCOSE SERPL-MCNC: 100 MG/DL — HIGH (ref 70–99)
HCT VFR BLD CALC: 31 % — LOW (ref 39–50)
HGB BLD-MCNC: 10.2 G/DL — LOW (ref 13–17)
MAGNESIUM SERPL-MCNC: 1.8 MG/DL — SIGNIFICANT CHANGE UP (ref 1.6–2.6)
MCHC RBC-ENTMCNC: 27.8 PG — SIGNIFICANT CHANGE UP (ref 27–34)
MCHC RBC-ENTMCNC: 32.9 GM/DL — SIGNIFICANT CHANGE UP (ref 32–36)
MCV RBC AUTO: 84.5 FL — SIGNIFICANT CHANGE UP (ref 80–100)
PLATELET # BLD AUTO: 131 K/UL — LOW (ref 150–400)
POTASSIUM SERPL-MCNC: 3.9 MMOL/L — SIGNIFICANT CHANGE UP (ref 3.5–5.3)
POTASSIUM SERPL-SCNC: 3.9 MMOL/L — SIGNIFICANT CHANGE UP (ref 3.5–5.3)
RBC # BLD: 3.67 M/UL — LOW (ref 4.2–5.8)
RBC # FLD: 13.1 % — SIGNIFICANT CHANGE UP (ref 10.3–14.5)
SODIUM SERPL-SCNC: 136 MMOL/L — SIGNIFICANT CHANGE UP (ref 135–145)
WBC # BLD: 6.46 K/UL — SIGNIFICANT CHANGE UP (ref 3.8–10.5)
WBC # FLD AUTO: 6.46 K/UL — SIGNIFICANT CHANGE UP (ref 3.8–10.5)

## 2021-01-20 PROCEDURE — 71045 X-RAY EXAM CHEST 1 VIEW: CPT | Mod: 26

## 2021-01-20 RX ORDER — POTASSIUM CHLORIDE 20 MEQ
20 PACKET (EA) ORAL ONCE
Refills: 0 | Status: COMPLETED | OUTPATIENT
Start: 2021-01-20 | End: 2021-01-20

## 2021-01-20 RX ORDER — MAGNESIUM SULFATE 500 MG/ML
2 VIAL (ML) INJECTION ONCE
Refills: 0 | Status: COMPLETED | OUTPATIENT
Start: 2021-01-20 | End: 2021-01-20

## 2021-01-20 RX ADMIN — GABAPENTIN 100 MILLIGRAM(S): 400 CAPSULE ORAL at 05:01

## 2021-01-20 RX ADMIN — Medication 1 MILLIGRAM(S): at 15:43

## 2021-01-20 RX ADMIN — SODIUM CHLORIDE 3 MILLILITER(S): 9 INJECTION INTRAMUSCULAR; INTRAVENOUS; SUBCUTANEOUS at 05:00

## 2021-01-20 RX ADMIN — Medication 20 MILLIEQUIVALENT(S): at 05:07

## 2021-01-20 RX ADMIN — Medication 50 GRAM(S): at 05:07

## 2021-01-20 RX ADMIN — GABAPENTIN 100 MILLIGRAM(S): 400 CAPSULE ORAL at 15:42

## 2021-01-20 RX ADMIN — PANTOPRAZOLE SODIUM 40 MILLIGRAM(S): 20 TABLET, DELAYED RELEASE ORAL at 15:43

## 2021-01-20 RX ADMIN — LIDOCAINE 1 PATCH: 4 CREAM TOPICAL at 20:20

## 2021-01-20 RX ADMIN — SENNA PLUS 2 TABLET(S): 8.6 TABLET ORAL at 20:20

## 2021-01-20 RX ADMIN — GABAPENTIN 100 MILLIGRAM(S): 400 CAPSULE ORAL at 20:20

## 2021-01-20 RX ADMIN — SODIUM CHLORIDE 3 MILLILITER(S): 9 INJECTION INTRAMUSCULAR; INTRAVENOUS; SUBCUTANEOUS at 14:27

## 2021-01-20 RX ADMIN — Medication 25 MILLIGRAM(S): at 18:59

## 2021-01-20 RX ADMIN — ENOXAPARIN SODIUM 40 MILLIGRAM(S): 100 INJECTION SUBCUTANEOUS at 20:20

## 2021-01-20 RX ADMIN — AMLODIPINE BESYLATE 5 MILLIGRAM(S): 2.5 TABLET ORAL at 05:01

## 2021-01-20 RX ADMIN — Medication 25 MILLIGRAM(S): at 05:01

## 2021-01-20 RX ADMIN — Medication 1 TABLET(S): at 15:42

## 2021-01-20 RX ADMIN — Medication 81 MILLIGRAM(S): at 15:43

## 2021-01-20 RX ADMIN — Medication 100 MILLIGRAM(S): at 15:43

## 2021-01-20 RX ADMIN — LIDOCAINE 1 PATCH: 4 CREAM TOPICAL at 00:00

## 2021-01-20 RX ADMIN — LIDOCAINE 1 PATCH: 4 CREAM TOPICAL at 15:43

## 2021-01-20 RX ADMIN — SODIUM CHLORIDE 3 MILLILITER(S): 9 INJECTION INTRAMUSCULAR; INTRAVENOUS; SUBCUTANEOUS at 20:21

## 2021-01-20 NOTE — CHART NOTE - NSCHARTNOTEFT_GEN_A_CORE
Brief summary:  55y Male POD# 5 s/p AVR. Post op course complicated by DT's and patient needing right pigtail for pneumothorax.      Patient seen and examined. Notes, flowsheets, medications, radiologic images and labs reviewed.  Pt oob to the chair on High flow 30L at 60 %. Patient admits to GUTIERREZ/ SOB which is allievated with rest. Pt denies acute pain with radiating or aggravating factors.  He denies chest pain, palpitations, headache, dizziness, nausea, or vomiting.    General: Well appearing, NAD  Neuro: AxO x3, non-focal, LIANG  Cardiac: S1S2, no murmurs  Pulm: b/l diminshed   Abdomen: Soft, NT, ND, normoactive BS  Peripheral: +DP pulses b/l, no peripheral edema  Incision site: Mepliex AG  C/D/I, sternum stable     PLAN   Congenital bicuspid aortic valve.   s/p AVR (t) on 1/15 with Dr. Cruz  Continue ASA 81 mg daily  Encourage IS hourly while awake  Ambulate with PT assist daily.  wean the High flow as tolerated   remove right pigtail as per Dr. Hernandez      Alcohol withdrawal.    Tremors now resolved  Continue CIWA protocol   Continue Thiamine 100 mg daily.     Current smoker on some days.    Plan: Will offer supportive care and counseling regarding smoking cessation prior to discharge.     Need for prophylactic measure.   Plan: Pantoprazole for GI prophylaxis  SCDs, Lovenox for DVT prophylaxis.     DISPO:  HOME tomorrow 1/20/21    PLAN of care discussed with Dr. Cruz and CTS in AM rounds

## 2021-01-21 ENCOUNTER — TRANSCRIPTION ENCOUNTER (OUTPATIENT)
Age: 56
End: 2021-01-21

## 2021-01-21 VITALS
OXYGEN SATURATION: 95 % | TEMPERATURE: 98 F | SYSTOLIC BLOOD PRESSURE: 118 MMHG | RESPIRATION RATE: 18 BRPM | DIASTOLIC BLOOD PRESSURE: 73 MMHG | HEART RATE: 75 BPM

## 2021-01-21 LAB
ANION GAP SERPL CALC-SCNC: 10 MMOL/L — SIGNIFICANT CHANGE UP (ref 5–17)
BUN SERPL-MCNC: 17 MG/DL — SIGNIFICANT CHANGE UP (ref 8–20)
CALCIUM SERPL-MCNC: 8.5 MG/DL — LOW (ref 8.6–10.2)
CHLORIDE SERPL-SCNC: 102 MMOL/L — SIGNIFICANT CHANGE UP (ref 98–107)
CO2 SERPL-SCNC: 24 MMOL/L — SIGNIFICANT CHANGE UP (ref 22–29)
CREAT SERPL-MCNC: 0.77 MG/DL — SIGNIFICANT CHANGE UP (ref 0.5–1.3)
GLUCOSE SERPL-MCNC: 92 MG/DL — SIGNIFICANT CHANGE UP (ref 70–99)
HCT VFR BLD CALC: 34.8 % — LOW (ref 39–50)
HGB BLD-MCNC: 11.7 G/DL — LOW (ref 13–17)
MAGNESIUM SERPL-MCNC: 2 MG/DL — SIGNIFICANT CHANGE UP (ref 1.6–2.6)
MCHC RBC-ENTMCNC: 28.3 PG — SIGNIFICANT CHANGE UP (ref 27–34)
MCHC RBC-ENTMCNC: 33.6 GM/DL — SIGNIFICANT CHANGE UP (ref 32–36)
MCV RBC AUTO: 84.1 FL — SIGNIFICANT CHANGE UP (ref 80–100)
PLATELET # BLD AUTO: 166 K/UL — SIGNIFICANT CHANGE UP (ref 150–400)
POTASSIUM SERPL-MCNC: 4.1 MMOL/L — SIGNIFICANT CHANGE UP (ref 3.5–5.3)
POTASSIUM SERPL-SCNC: 4.1 MMOL/L — SIGNIFICANT CHANGE UP (ref 3.5–5.3)
RBC # BLD: 4.14 M/UL — LOW (ref 4.2–5.8)
RBC # FLD: 13.1 % — SIGNIFICANT CHANGE UP (ref 10.3–14.5)
SODIUM SERPL-SCNC: 136 MMOL/L — SIGNIFICANT CHANGE UP (ref 135–145)
WBC # BLD: 7.6 K/UL — SIGNIFICANT CHANGE UP (ref 3.8–10.5)
WBC # FLD AUTO: 7.6 K/UL — SIGNIFICANT CHANGE UP (ref 3.8–10.5)

## 2021-01-21 PROCEDURE — 99024 POSTOP FOLLOW-UP VISIT: CPT

## 2021-01-21 PROCEDURE — 85610 PROTHROMBIN TIME: CPT

## 2021-01-21 PROCEDURE — C1889: CPT

## 2021-01-21 PROCEDURE — 84100 ASSAY OF PHOSPHORUS: CPT

## 2021-01-21 PROCEDURE — 71045 X-RAY EXAM CHEST 1 VIEW: CPT

## 2021-01-21 PROCEDURE — 97530 THERAPEUTIC ACTIVITIES: CPT

## 2021-01-21 PROCEDURE — 82435 ASSAY OF BLOOD CHLORIDE: CPT

## 2021-01-21 PROCEDURE — 82803 BLOOD GASES ANY COMBINATION: CPT

## 2021-01-21 PROCEDURE — 82962 GLUCOSE BLOOD TEST: CPT

## 2021-01-21 PROCEDURE — 97163 PT EVAL HIGH COMPLEX 45 MIN: CPT

## 2021-01-21 PROCEDURE — 80048 BASIC METABOLIC PNL TOTAL CA: CPT

## 2021-01-21 PROCEDURE — 93325 DOPPLER ECHO COLOR FLOW MAPG: CPT

## 2021-01-21 PROCEDURE — C8929: CPT

## 2021-01-21 PROCEDURE — 80053 COMPREHEN METABOLIC PANEL: CPT

## 2021-01-21 PROCEDURE — 84132 ASSAY OF SERUM POTASSIUM: CPT

## 2021-01-21 PROCEDURE — 85014 HEMATOCRIT: CPT

## 2021-01-21 PROCEDURE — 94760 N-INVAS EAR/PLS OXIMETRY 1: CPT

## 2021-01-21 PROCEDURE — 93312 ECHO TRANSESOPHAGEAL: CPT

## 2021-01-21 PROCEDURE — 85027 COMPLETE CBC AUTOMATED: CPT

## 2021-01-21 PROCEDURE — C1751: CPT

## 2021-01-21 PROCEDURE — 97116 GAIT TRAINING THERAPY: CPT

## 2021-01-21 PROCEDURE — 85730 THROMBOPLASTIN TIME PARTIAL: CPT

## 2021-01-21 PROCEDURE — 83735 ASSAY OF MAGNESIUM: CPT

## 2021-01-21 PROCEDURE — 84295 ASSAY OF SERUM SODIUM: CPT

## 2021-01-21 PROCEDURE — 94002 VENT MGMT INPAT INIT DAY: CPT

## 2021-01-21 PROCEDURE — 80076 HEPATIC FUNCTION PANEL: CPT

## 2021-01-21 PROCEDURE — 36600 WITHDRAWAL OF ARTERIAL BLOOD: CPT

## 2021-01-21 PROCEDURE — P9045: CPT

## 2021-01-21 PROCEDURE — 88305 TISSUE EXAM BY PATHOLOGIST: CPT

## 2021-01-21 PROCEDURE — 82947 ASSAY GLUCOSE BLOOD QUANT: CPT

## 2021-01-21 PROCEDURE — 85018 HEMOGLOBIN: CPT

## 2021-01-21 PROCEDURE — 88311 DECALCIFY TISSUE: CPT

## 2021-01-21 PROCEDURE — 82330 ASSAY OF CALCIUM: CPT

## 2021-01-21 PROCEDURE — 71045 X-RAY EXAM CHEST 1 VIEW: CPT | Mod: 26

## 2021-01-21 PROCEDURE — 93005 ELECTROCARDIOGRAM TRACING: CPT

## 2021-01-21 PROCEDURE — 36415 COLL VENOUS BLD VENIPUNCTURE: CPT

## 2021-01-21 PROCEDURE — 93320 DOPPLER ECHO COMPLETE: CPT

## 2021-01-21 PROCEDURE — 83605 ASSAY OF LACTIC ACID: CPT

## 2021-01-21 PROCEDURE — C1769: CPT

## 2021-01-21 RX ORDER — AMLODIPINE BESYLATE 2.5 MG/1
1 TABLET ORAL
Qty: 30 | Refills: 1
Start: 2021-01-21 | End: 2021-03-21

## 2021-01-21 RX ORDER — MULTIVIT-MIN/FERROUS GLUCONATE 9 MG/15 ML
1 LIQUID (ML) ORAL
Qty: 30 | Refills: 0
Start: 2021-01-21 | End: 2021-02-19

## 2021-01-21 RX ORDER — FOLIC ACID 0.8 MG
1 TABLET ORAL
Qty: 30 | Refills: 0
Start: 2021-01-21 | End: 2021-02-19

## 2021-01-21 RX ORDER — THIAMINE MONONITRATE (VIT B1) 100 MG
1 TABLET ORAL
Qty: 30 | Refills: 0
Start: 2021-01-21 | End: 2021-02-19

## 2021-01-21 RX ORDER — ASPIRIN/CALCIUM CARB/MAGNESIUM 324 MG
1 TABLET ORAL
Qty: 30 | Refills: 1
Start: 2021-01-21 | End: 2021-03-21

## 2021-01-21 RX ORDER — SENNA PLUS 8.6 MG/1
2 TABLET ORAL
Qty: 14 | Refills: 0
Start: 2021-01-21 | End: 2021-01-27

## 2021-01-21 RX ORDER — METOPROLOL TARTRATE 50 MG
1 TABLET ORAL
Qty: 60 | Refills: 1
Start: 2021-01-21 | End: 2021-03-21

## 2021-01-21 RX ADMIN — Medication 1 MILLIGRAM(S): at 12:32

## 2021-01-21 RX ADMIN — ENOXAPARIN SODIUM 40 MILLIGRAM(S): 100 INJECTION SUBCUTANEOUS at 12:33

## 2021-01-21 RX ADMIN — Medication 81 MILLIGRAM(S): at 12:32

## 2021-01-21 RX ADMIN — Medication 1 TABLET(S): at 12:32

## 2021-01-21 RX ADMIN — Medication 100 MILLIGRAM(S): at 12:32

## 2021-01-21 RX ADMIN — SODIUM CHLORIDE 3 MILLILITER(S): 9 INJECTION INTRAMUSCULAR; INTRAVENOUS; SUBCUTANEOUS at 05:02

## 2021-01-21 RX ADMIN — Medication 25 MILLIGRAM(S): at 05:01

## 2021-01-21 RX ADMIN — Medication 25 MILLIGRAM(S): at 17:18

## 2021-01-21 RX ADMIN — GABAPENTIN 100 MILLIGRAM(S): 400 CAPSULE ORAL at 05:01

## 2021-01-21 RX ADMIN — PANTOPRAZOLE SODIUM 40 MILLIGRAM(S): 20 TABLET, DELAYED RELEASE ORAL at 12:32

## 2021-01-21 RX ADMIN — AMLODIPINE BESYLATE 5 MILLIGRAM(S): 2.5 TABLET ORAL at 05:01

## 2021-01-21 NOTE — DISCHARGE NOTE PROVIDER - NSDCCPCAREPLAN_GEN_ALL_CORE_FT
PRINCIPAL DISCHARGE DIAGNOSIS  Diagnosis: Aortic stenosis  Assessment and Plan of Treatment: 1. Take ALL of your medications as ordered. Fill your prescriptions the day you are discharged and take according to the schedule you were given. Continue to take a stool softener if you are taking narcotic pain medications. AVOID medications such as ibuprofen or naproxen if you have had bypass surgery. If you have any questions or are unable to fill the prescriptions, please call the office right away at [phone].  2. Shower daily. Clean all incisions daily while showering with warm water and mild soap, pat dry with a clean towel and do not cover with any dressings unless instructed to. No bathing, swimming in a pool or the ocean until instructed by MD.  DO NOT use creams or lotions on the wound.  3. We advise that you do not drive until instructed by MD.   4. You may not return to work until instructed by MD.   5. Please eat a low fat, low cholesterol, low salt diet. (No added/extra salt)  6. Weigh yourself every day in the morning and record it in the weight log in your red folder. Notify the office of any weight gain more than 2-3 pounds in 24 hours.  7. Continue breathing exercises several times a day. Continue to use your heart pillow.  8. No heavy lifting nothing greater than 5 pounds until cleared by MD.   9. Call / Notify MD any fever greater than 101.0, any drainage from incisions or if they become red, hot or very tender to the touch.  10. Increase activity as tolerated. Walk indoors and/or outdoors at least 3 times a day.        SECONDARY DISCHARGE DIAGNOSES  Diagnosis: Bicuspid aortic valve  Assessment and Plan of Treatment: Now s/p AVR  - You will receive a wallet card about your new valve in the mail.  Please carry it with you to present to anyone who may ask if you have any medical implants.  - Be sure to inform your doctors including your dentist about your valve since you will need to take antibiotics to reduce the risk of infection before certain medical and dental procedures.      Diagnosis: Thoracic aortic aneurysm, without rupture  Assessment and Plan of Treatment:     Diagnosis: Alcohol withdrawal  Assessment and Plan of Treatment: Alcohol withdrawal     PRINCIPAL DISCHARGE DIAGNOSIS  Diagnosis: Aortic stenosis  Assessment and Plan of Treatment: 1. Take ALL of your medications as ordered. Fill your prescriptions the day you are discharged and take according to the schedule you were given. Continue to take a stool softener if you are taking narcotic pain medications. AVOID medications such as ibuprofen or naproxen if you have had bypass surgery. If you have any questions or are unable to fill the prescriptions, please call the office right away at [phone].  2. Shower daily. Clean all incisions daily while showering with warm water and mild soap, pat dry with a clean towel and do not cover with any dressings unless instructed to. No bathing, swimming in a pool or the ocean until instructed by MD.  DO NOT use creams or lotions on the wound.  3. We advise that you do not drive until instructed by MD.   4. You may not return to work until instructed by MD.   5. Please eat a low fat, low cholesterol, low salt diet. (No added/extra salt)  6. Weigh yourself every day in the morning and record it in the weight log in your red folder. Notify the office of any weight gain more than 2-3 pounds in 24 hours.  7. Continue breathing exercises several times a day. Continue to use your heart pillow.  8. No heavy lifting nothing greater than 5 pounds until cleared by MD.   9. Call / Notify MD any fever greater than 101.0, any drainage from incisions or if they become red, hot or very tender to the touch.  10. Increase activity as tolerated. Walk indoors and/or outdoors at least 3 times a day.        SECONDARY DISCHARGE DIAGNOSES  Diagnosis: Bicuspid aortic valve  Assessment and Plan of Treatment: Now s/p AVR  - You will receive a wallet card about your new valve in the mail.  Please carry it with you to present to anyone who may ask if you have any medical implants.  - Be sure to inform your doctors including your dentist about your valve since you will need to take antibiotics to reduce the risk of infection before certain medical and dental procedures.      Diagnosis: Alcohol withdrawal  Assessment and Plan of Treatment: Alcohol withdrawal  Take all medications as prescribed. Follow up with your primary care doctor within two weeks.      Diagnosis: Thoracic aortic aneurysm, without rupture  Assessment and Plan of Treatment:

## 2021-01-21 NOTE — PROGRESS NOTE ADULT - NUTRITIONAL ASSESSMENT
This patient has been assessed with a concern for Malnutrition and has been determined to have a diagnosis/diagnoses of Moderate protein-calorie malnutrition.    This patient is being managed with:   Diet Regular-  Consistent Carbohydrate {No Snacks} (CSTCHO)  DASH/TLC {Sodium & Cholesterol Restricted} (DASH)  Supplement Feeding Modality:  Oral  Ensure Enlive Cans or Servings Per Day:  3       Frequency:  Three Times a day  Entered: Jan 18 2021 12:52PM    
This patient has been assessed with a concern for Malnutrition and has been determined to have a diagnosis/diagnoses of Moderate protein-calorie malnutrition.    This patient is being managed with:   Diet Regular-  Consistent Carbohydrate {No Snacks} (CSTCHO)  DASH/TLC {Sodium & Cholesterol Restricted} (DASH)  Supplement Feeding Modality:  Oral  Ensure Enlive Cans or Servings Per Day:  3       Frequency:  Three Times a day  Entered: Jan 18 2021 12:52PM

## 2021-01-21 NOTE — PROGRESS NOTE ADULT - ASSESSMENT
54 yo M PMH of prior tobacco abuse, congenital bicuspid aortic valve.  On 1/15 pt underwent AVR with Dr. Cruz, post op course uneventful.
54 yo M PMH of prior tobacco abuse, congenital bicuspid aortic valve.  On 1/15 pt underwent AVR with Dr. Cruz, post op course complicated by ETOH withdrawal requiring CIWA protocol.
54 yo M PMH of prior tobacco abuse, congenital bicuspid aortic valve.  On 1/15 pt underwent AVR with Dr. Cruz, post op course uneventful.
56 yo M PMH of prior tobacco abuse, congenital bicuspid aortic valve.  On 1/15 pt underwent AVR with Dr. Cruz, post op course complicated by ETPOH withdrawal requiring CIWA protocol.
56 yo M PMH of prior tobacco abuse, congenital bicuspid aortic valve.  On 1/15 pt underwent AVR with Dr. Cruz, post op course uneventful.

## 2021-01-21 NOTE — PROGRESS NOTE ADULT - PROBLEM SELECTOR PROBLEM 3
Current smoker on some days
Need for prophylactic measure
Current smoker on some days

## 2021-01-21 NOTE — DISCHARGE NOTE PROVIDER - CARE PROVIDER_API CALL
Carlos Enrique Cruz)  Surgery; Thoracic and Cardiac Surgery  [address] [place] [address]  Phone: [phone]  Fax: [phone]  Follow Up Time:    Carlos Enrique Cruz  [address] [place] [address], [address], [place], [place].  Phone: [phone]  Fax: (   )    -  Scheduled Appointment: 02/03/2021 03:45 PM

## 2021-01-21 NOTE — PROGRESS NOTE ADULT - PROBLEM SELECTOR PROBLEM 2
Alcohol withdrawal
Current smoker on some days
Alcohol withdrawal

## 2021-01-21 NOTE — PROGRESS NOTE ADULT - SUBJECTIVE AND OBJECTIVE BOX
Subjective: patient lying in bed, no acute distress noted, denies chest pian, pressure, dizziness, headache, shortness of breath, abdominal pian, N/V/D.     VITAL SIGNS  Vital Signs Last 24 Hrs  T(C): 37.2 (21 @ 20:15), Max: 37.2 (21 @ 09:51)  T(F): 99 (21 @ 20:15), Max: 99 (21 @ 09:51)  HR: 71 (21 @ 20:15) (64 - 73)  BP: 102/64 (21 @ 20:15) (102/64 - 114/73)  RR: 18 (21 @ 20:15) (18 - 20)  SpO2: 93% (21 @ 20:15) (93% - 99%)  on 4L O2          Telemetry/Alarms:    LVEF:     MEDICATIONS  acetaminophen   Tablet .. 975 milliGRAM(s) Oral every 6 hours PRN  amLODIPine   Tablet 5 milliGRAM(s) Oral daily  aspirin enteric coated 81 milliGRAM(s) Oral daily  enoxaparin Injectable 40 milliGRAM(s) SubCutaneous daily  folic acid 1 milliGRAM(s) Oral daily  gabapentin 100 milliGRAM(s) Oral three times a day  lidocaine   Patch 1 Patch Transdermal daily  metoprolol tartrate 25 milliGRAM(s) Oral two times a day  multivitamin/minerals 1 Tablet(s) Oral daily  oxyCODONE    IR 5 milliGRAM(s) Oral every 4 hours PRN  oxyCODONE    IR 10 milliGRAM(s) Oral every 4 hours PRN  pantoprazole    Tablet 40 milliGRAM(s) Oral daily  polyethylene glycol 3350 17 Gram(s) Oral daily PRN  senna 2 Tablet(s) Oral at bedtime  sodium chloride 0.9% lock flush 3 milliLiter(s) IV Push every 8 hours  thiamine 100 milliGRAM(s) Oral daily      PHYSICAL EXAM  General: well nourished, well developed, no acute distress  Neurology: alert and oriented x 3, nonfocal, no gross deficits  Respiratory: clear to auscultation bilaterally  CV: regular rate and rhythm, normal S1, S2  Abdomen: soft, nontender, nondistended, positive bowel sounds, last bowel movement    Extremities: warm, well perfused. Trace BLE edema. + DP pulses bilaterally  Incisions: midline sternal incision, + Island dressing, c/d/i. sternum stable.   Epicardial Wires: isolated  Pych: Calm, appropriate mood and affect     @ 07:01  -   @ 07:00  --------------------------------------------------------  IN: 350 mL / OUT: 1510 mL / NET: -1160 mL        Weights:  Daily     Daily Weight in k.7 (2021 07:00)  Admit Wt: Drug Dosing Weight  Height (cm): 172.7 (15 Ramon 2021 05:50)  Weight (kg): 62.7 (15 Ramon 2021 05:50)  BMI (kg/m2): 21 (15 Ramon 2021 05:50)  BSA (m2): 1.75 (15 Ramon 2021 05:50)    All laboratory results, radiology and medications reviewed.    LABS      136  |  103  |  17.0  ----------------------------<  100<H>  3.9   |  25.0  |  0.77    Ca    8.4<L>      2021 03:05  Mg     1.8                                        10.2   6.46  )-----------( 131      ( 2021 03:05 )             31.0          < from: Xray Chest 1 View- PORTABLE-Urgent (Xray Chest 1 View- PORTABLE-Urgent .) (21 @ 09:56) >  IMPRESSION: Final film shows removal of right chest tube without incident. Basilar lung findings left greater than right showing some improvement on the last film.      < end of copied text >    < from: 12 Lead ECG (21 @ 05:39) >    Ventricular Rate 77 BPM    Atrial Rate 77 BPM    P-R Interval 164 ms    QRS Duration 88 ms    Q-T Interval 386 ms    QTC Calculation(Bazett) 436 ms    P Axis 49 degrees    R Axis 56 degrees    T Axis 65 degrees    Diagnosis Line Normal sinus rhythm  Minimal voltage criteria for LVH, may be normal variant  Nonspecific T wave abnormality  Abnormal ECG      < end of copied text >    PAST MEDICAL & SURGICAL HISTORY:  Current smoker on some days  5 cogars/week    Congenital bicuspid aortic valve    Thoracic aortic aneurysm, without rupture    Aortic stenosis    Other tear of medial meniscus, current injury, unspecified knee, initial encounter    Basal cell carcinoma  Multiple areas for the past 10-12 years. Being followed by Dr. Almaraz.    History of meniscectomy of left knee      History of surgery  Basal cell carcinoma, areas included are: neck, scalp, face, arms, chest, back        Subjective: patient lying in bed, no acute distress noted, denies chest pian, pressure, dizziness, headache, shortness of breath, abdominal pian, N/V/D.     VITAL SIGNS  Vital Signs Last 24 Hrs  T(C): 37.2 (21 @ 20:15), Max: 37.2 (21 @ 09:51)  T(F): 99 (21 @ 20:15), Max: 99 (21 @ 09:51)  HR: 71 (21 @ 20:15) (64 - 73)  BP: 102/64 (21 @ 20:15) (102/64 - 114/73)  RR: 18 (21 @ 20:15) (18 - 20)  SpO2: 93% (21 @ 20:15) (93% - 99%)  on 4L O2          Telemetry/Alarms:  SR  LVEF: 65%    MEDICATIONS  acetaminophen   Tablet .. 975 milliGRAM(s) Oral every 6 hours PRN  amLODIPine   Tablet 5 milliGRAM(s) Oral daily  aspirin enteric coated 81 milliGRAM(s) Oral daily  enoxaparin Injectable 40 milliGRAM(s) SubCutaneous daily  folic acid 1 milliGRAM(s) Oral daily  gabapentin 100 milliGRAM(s) Oral three times a day  lidocaine   Patch 1 Patch Transdermal daily  metoprolol tartrate 25 milliGRAM(s) Oral two times a day  multivitamin/minerals 1 Tablet(s) Oral daily  oxyCODONE    IR 5 milliGRAM(s) Oral every 4 hours PRN  oxyCODONE    IR 10 milliGRAM(s) Oral every 4 hours PRN  pantoprazole    Tablet 40 milliGRAM(s) Oral daily  polyethylene glycol 3350 17 Gram(s) Oral daily PRN  senna 2 Tablet(s) Oral at bedtime  sodium chloride 0.9% lock flush 3 milliLiter(s) IV Push every 8 hours  thiamine 100 milliGRAM(s) Oral daily      PHYSICAL EXAM  General: well nourished, well developed, no acute distress  Neurology: alert and oriented x 3, nonfocal, no gross deficits  Respiratory: clear to auscultation bilaterally  CV: regular rate and rhythm, normal S1, S2  Abdomen: soft, nontender, nondistended, positive bowel sounds, last bowel movement    Extremities: warm, well perfused. Trace BLE edema. + DP pulses bilaterally  Incisions: midline sternal incision, + Island dressing, c/d/i. sternum stable.   Epicardial Wires: isolated  Pych: Calm, appropriate mood and affect     @ 07:01  -   @ 07:00  --------------------------------------------------------  IN: 350 mL / OUT: 1510 mL / NET: -1160 mL        Weights:  Daily     Daily Weight in k.7 (2021 07:00)  Admit Wt: Drug Dosing Weight  Height (cm): 172.7 (15 Ramon 2021 05:50)  Weight (kg): 62.7 (15 Ramon 2021 05:50)  BMI (kg/m2): 21 (15 Ramon 2021 05:50)  BSA (m2): 1.75 (15 Ramon 2021 05:50)    All laboratory results, radiology and medications reviewed.    LABS      136  |  103  |  17.0  ----------------------------<  100<H>  3.9   |  25.0  |  0.77    Ca    8.4<L>      2021 03:05  Mg     1.8                                        10.2   6.46  )-----------( 131      ( 2021 03:05 )             31.0          < from: Xray Chest 1 View- PORTABLE-Urgent (Xray Chest 1 View- PORTABLE-Urgent .) (21 @ 09:56) >  IMPRESSION: Final film shows removal of right chest tube without incident. Basilar lung findings left greater than right showing some improvement on the last film.      < end of copied text >    < from: 12 Lead ECG (21 @ 05:39) >    Ventricular Rate 77 BPM    Atrial Rate 77 BPM    P-R Interval 164 ms    QRS Duration 88 ms    Q-T Interval 386 ms    QTC Calculation(Bazett) 436 ms    P Axis 49 degrees    R Axis 56 degrees    T Axis 65 degrees    Diagnosis Line Normal sinus rhythm  Minimal voltage criteria for LVH, may be normal variant  Nonspecific T wave abnormality  Abnormal ECG      < end of copied text >    PAST MEDICAL & SURGICAL HISTORY:  Current smoker on some days  5 cogars/week    Congenital bicuspid aortic valve    Thoracic aortic aneurysm, without rupture    Aortic stenosis    Other tear of medial meniscus, current injury, unspecified knee, initial encounter    Basal cell carcinoma  Multiple areas for the past 10-12 years. Being followed by Dr. Almaraz.    History of meniscectomy of left knee      History of surgery  Basal cell carcinoma, areas included are: neck, scalp, face, arms, chest, back

## 2021-01-21 NOTE — DISCHARGE NOTE PROVIDER - DETAILS OF MALNUTRITION DIAGNOSIS/DIAGNOSES
This patient has been assessed with a concern for Malnutrition and was treated during this hospitalization for the following Nutrition diagnosis/diagnoses:     -  01/18/2021: Moderate protein-calorie malnutrition   This patient has been assessed with a concern for Malnutrition and was treated during this hospitalization for the following Nutrition diagnosis/diagnoses:     -  01/18/2021: Moderate protein-calorie malnutrition    This patient has been assessed with a concern for Malnutrition and was treated during this hospitalization for the following Nutrition diagnosis/diagnoses:     -  01/18/2021: Moderate protein-calorie malnutrition

## 2021-01-21 NOTE — PROGRESS NOTE ADULT - PROBLEM SELECTOR PLAN 3
Will offer supportive care and counseling regarding smoking cessation prior to discharge
Pantoprazole for GI prophylaxis  SCDs, Lovenox for DVT prophylaxis

## 2021-01-21 NOTE — DISCHARGE NOTE NURSING/CASE MANAGEMENT/SOCIAL WORK - PATIENT PORTAL LINK FT
You can access the FollowMyHealth Patient Portal offered by [facility] by registering at the following website: [email or web address]. By joining [facility]’s FollowMyHealth portal, you will also be able to view your health information using other applications (apps) compatible with our system.

## 2021-01-21 NOTE — DISCHARGE NOTE PROVIDER - NSDCMRMEDTOKEN_GEN_ALL_CORE_FT
amLODIPine 5 mg oral tablet: 1 tab(s) orally once a day  aspirin 81 mg oral delayed release tablet: 1 tab(s) orally once a day  folic acid 1 mg oral tablet: 1 tab(s) orally once a day  metoprolol tartrate 25 mg oral tablet: 1 tab(s) orally 2 times a day  Multiple Vitamins with Minerals oral tablet: 1 tab(s) orally once a day  oxycodone-acetaminophen 5 mg-325 mg oral tablet: 1 tab(s) orally every 6 hours MDD:4 tabs  senna oral tablet: 2 tab(s) orally once a day (at bedtime)  thiamine 100 mg oral tablet: 1 tab(s) orally once a day

## 2021-01-21 NOTE — DISCHARGE NOTE PROVIDER - NSDCFUADDINST_GEN_ALL_CORE_FT
Please call the Cardiothoracic Surgery office at [phone] if you are experiencing any shortness of breath, chest pain, fevers or chills, drainage from the incisions, persistent nausea, vomiting or if you have any questions about your medications. If the symptoms are severe, call 911 and go to the nearest hospital. You can also call [phone] for an emergency [facility] ambulance, which will take you to the closest [facility].    If you need any assistance for making any appointments for a new consult or referral in any specialty, please call our [facility] Clinical Coordination Center at [phone].  Your Care Navigator Nurse Practitioner will be in touch to see you in your home within a few days from discharge. The Follow Your Heart program can help ensure you understand your medications, discharge instructions and answer any questions you may have at that time. They are also a great source to address concerns during the day and may be reached at [phone].   Please call the Cardiothoracic Surgery office at [phone] if you are experiencing any shortness of breath, chest pain, fevers or chills, drainage from the incisions, persistent nausea, vomiting or if you have any questions about your medications. If the symptoms are severe, call 911 and go to the nearest hospital. You can also call [phone] for an emergency [facility] ambulance, which will take you to the closest [facility].    If you need any assistance for making any appointments for a new consult or referral in any specialty, please call our [facility] Clinical Coordination Center at [phone].  Your Care Navigator Nurse Practitioner will be in touch to see you in your home within a few days from discharge. The Follow Your Heart program can help ensure you understand your medications, discharge instructions and answer any questions you may have at that time. They are also a great source to address concerns during the day and may be reached at [phone].  Efforts to Avoid COVID-19 Infection      When your body is in a weakened state, such as after major heart or lung surgery, you could be more susceptible to infection and those infections could quickly become severe. Don’t be frightened but do stay home.    1. Take precaution   Currently, they advise at-risk populations to: avoid large crowds, stock up on supplies (especially medications), wash hands often (for a minimum of 20 seconds), stay home as much as possible and practice social distancing (six feet is recommended) when outside the home.   2. Use technology and this includes the phone   Applications like [employer], [employer], texting and even an old-fashioned phone call can keep those at-risk connected to family, friends and replace in-person participation with real-time interaction at family gatherings or social functions, until it is safe to resume in-person activity. It also keeps those at-risk connected to care providers. It’s important to remember that social distancing may be recommended but social isolation is not.   3. Be Knowledgeable   Know the emergency warning signs for COVID-19. If you notice any of the following symptoms: fevers, difficulty breathing or shortness of breath; persistent chest pain or pressure or new confusion – make sure you seek immediate medical attention.   4. Stay Up-to-Date   Pay attention to the latest up-to-date news regarding the situation and what is happening in the community so you can respond accordingly.      Your recovery is as dependent on continuing to move and be active as it is to avoid infection.   We encourage you to go outside and take brief walks, but while we are in a state of emergency and COVID-19 is on the rise, please limit contact with the public.

## 2021-01-21 NOTE — CHART NOTE - NSCHARTNOTEFT_GEN_A_CORE
Brief summary:  55y Male POD# 5 s/p AVR. Post op course complicated by DT's and patient needing right pigtail for pneumothorax, which has since been removed. Pt also weaned off HFNC to NC, now to RM. Pt remain HD stable       Patient seen and examined. Notes, flowsheets, medications, radiologic images and labs reviewed.  Pt oob to the chair on rm air state, "I feel much better today". Patient denies acute pain with radiating or aggravating factors.  He denies chest pain, shortness of breath, palpitations, headache, dizziness, nausea, or vomiting.      General: Well appearing, NAD  Neuro: AxO x3, non-focal, LIANG  Cardiac: S1S2, no murmurs  Pulm: b/l diminshed   Abdomen: Soft, NT, ND, normoactive BS  Peripheral: +DP pulses b/l, trace peripheral edema  Incision site: midsternal C/D/I, sternum stable     PLAN   Congenital bicuspid aortic valve.   s/p AVR (t) on 1/15 with Dr. Cruz  Continue ASA 81 mg daily  Encourage IS hourly while awake  Ambulate with PT assist daily  D/C PW   ECHO ordered as per JG   pt to be discharged home pending echo       Alcohol withdrawal.    Tremors now resolved  Continue CIWA protocol   Continue Thiamine 100 mg daily.     Current smoker on some days.    Plan: Will offer supportive care and counseling regarding smoking cessation prior to discharge.     Need for prophylactic measure.   Plan: Pantoprazole for GI prophylaxis  SCDs, Lovenox for DVT prophylaxis.     DISPO:  HOME PENDING ECHO RESULTS     PLAN of care discussed with Dr. Cruz and CTS in AM

## 2021-01-21 NOTE — PROGRESS NOTE ADULT - PROBLEM SELECTOR PLAN 4
Pantoprazole for GI prophylaxis  SCDs, Lovenox for DVT prophylaxis
Pantoprazole for GI prophylaxis  SCDs, Lovenox for DVT prophylaxis
Pantoprazole for GI prophylaxis  SCDs, Lovenox for DVT prophylaxis  Plan needs to discuss with CTS team in AM
Pantoprazole for GI prophylaxis  SCDs, Lovenox for DVT prophylaxis

## 2021-01-21 NOTE — PROGRESS NOTE ADULT - PROBLEM SELECTOR PLAN 1
s/p AVR (t) on 1/15 with Dr. Cruz  Continue ASA 81 mg daily  Encourage IS hourly while awake  Ambulate with PT assist daily
s/p AVR (t) on 1/15 with Dr. Cruz  Continue ASA 81 mg daily  Pain management as needed  Continue bowel regimen  Encourage IS hourly while awake, CDBE, chest PT  OOB to chair, ambulate as tolerated  PT assist daily  Wean off O2 as tolerated
s/p AVR (t) on 1/15 with Dr. Cruz  No inotropic or pressor support required  Continue ASA 81 mg daily  Extubated, encourage IS hourly while awake  Ambulate with PT assist daily
s/p AVR (t) on 1/15 with Dr. Cruz  Continue ASA 81 mg daily  Encourage IS hourly while awake  Ambulate with PT assist daily
s/p AVR (t) on 1/15 with Dr. Cruz  Continue ASA 81 mg daily  Encourage IS hourly while awake  Ambulate with PT assist daily

## 2021-01-21 NOTE — DISCHARGE NOTE PROVIDER - NSDCCPTREATMENT_GEN_ALL_CORE_FT
PRINCIPAL PROCEDURE  Procedure: Replacement, aortic valve, with ROGELIO  Findings and Treatment: 25mm inspiris valve

## 2021-01-21 NOTE — PROGRESS NOTE ADULT - PROBLEM SELECTOR PLAN 2
Exhibiting tremors  CIWA protocol started  Ativan given  Thiamine 100 mg daily ordered
Tremors now resolved  Continue CIWA protocol   Continue Thiamine 100 mg daily
Will offer supportive care and counseling regarding smoking cessation prior to discharge
Exhibiting tremors  CIWA protocol started  Ativan given  Thiamine 100 mg daily ordered
Tremors now resolved  Continue CIWA protocol   Continue Thiamine 100 mg daily

## 2021-01-21 NOTE — DISCHARGE NOTE PROVIDER - HOSPITAL COURSE
55 year old M with PMH of prior tobacco abuse, congenital bicuspid aortic valve.  On 1/15 pt underwent AVR with Dr. Cruz, post op course complicated by ETOH withdrawal requiring CIWA protocol, now resolved.    55 year old M with PMH of prior tobacco abuse, congenital bicuspid aortic valve.  On 1/15 pt underwent AVR with Dr. Cruz, post op course complicated by ETOH withdrawal requiring CIWA protocol, right pneumothorax, and acute hypoxemia all which has now resolved. Pt was seen and assessed at the bedside with   Dr. Cruz and determined stable for discharge with follow up appt Feb 3, 2021 at 0345.

## 2021-01-21 NOTE — PROGRESS NOTE ADULT - PROBLEM SELECTOR PROBLEM 1
Congenital bicuspid aortic valve

## 2021-01-21 NOTE — DISCHARGE NOTE PROVIDER - NSDCFUADDAPPT_GEN_ALL_CORE_FT
Please follow up with Dr. Cruz on  at [facility].        Please follow up with Dr. Cruz on  at [facility] [place].     **Please obtain Chest X-Ray (script enclosed in folder) 1-2 days PRIOR to scheduled appointment, preferably at a [facility] facility.**    The cardiac surgery office is located at [address] [place] [address], [facility] [address] [place], [place].   Please call the CT Surgery office upon arrival to your appointment, you will then be instructed when to enter the building. This is being done due to COVID precautions.

## 2021-01-21 NOTE — DISCHARGE NOTE PROVIDER - PROVIDER TOKENS
PROVIDER:[TOKEN:[[id number]]] FREE:[LAST:[Nancy],FIRST:[Carlos Enrique],PHONE:[phone]],FAX:[(   )    -],ADDRESS:[[address] [place] [address], [address]],SCHEDULEDAPPT:[02/03/2021],SCHEDULEDAPPTTIME:[03:45 PM]]

## 2021-01-22 ENCOUNTER — NON-APPOINTMENT (OUTPATIENT)
Age: 56
End: 2021-01-22

## 2021-01-22 PROBLEM — I71.2 THORACIC AORTIC ANEURYSM, WITHOUT RUPTURE: Chronic | Status: ACTIVE | Noted: 2021-01-04

## 2021-01-22 PROBLEM — Q23.1 CONGENITAL INSUFFICIENCY OF AORTIC VALVE: Chronic | Status: ACTIVE | Noted: 2021-01-04

## 2021-01-22 PROBLEM — F17.200 NICOTINE DEPENDENCE, UNSPECIFIED, UNCOMPLICATED: Chronic | Status: ACTIVE | Noted: 2021-01-04

## 2021-01-22 RX ORDER — MULTIVITAMIN
TABLET ORAL
Refills: 0 | Status: DISCONTINUED | COMMUNITY
Start: 2021-01-22 | End: 2021-01-22

## 2021-01-25 LAB — SURGICAL PATHOLOGY STUDY: SIGNIFICANT CHANGE UP

## 2021-01-26 ENCOUNTER — NON-APPOINTMENT (OUTPATIENT)
Age: 56
End: 2021-01-26

## 2021-01-28 ENCOUNTER — APPOINTMENT (OUTPATIENT)
Dept: CARE COORDINATION | Facility: HOME HEALTH | Age: 56
End: 2021-01-28
Payer: COMMERCIAL

## 2021-01-28 PROCEDURE — 99024 POSTOP FOLLOW-UP VISIT: CPT

## 2021-01-29 VITALS
HEIGHT: 68 IN | RESPIRATION RATE: 16 BRPM | OXYGEN SATURATION: 97 % | DIASTOLIC BLOOD PRESSURE: 74 MMHG | HEART RATE: 75 BPM | WEIGHT: 130 LBS | SYSTOLIC BLOOD PRESSURE: 108 MMHG | BODY MASS INDEX: 19.7 KG/M2

## 2021-01-29 NOTE — ASSESSMENT
[FreeTextEntry1] : Pt recovering well at home s/p OHS. Reviewed all medications and dosages with pt understanding. Pt has all medications in home and is taking as prescribed. Pain controlled with current medication regimen. No new symptoms, issues or concerns to report at this time.\par

## 2021-01-29 NOTE — HISTORY OF PRESENT ILLNESS
[FreeTextEntry1] : 55 YOM with PMH of prior tobacco abuse, congenital bicuspid aortic \par valve.  On 1/15 pt underwent AVR with Dr. Cruz, post op course complicated \par by ETOH withdrawal requiring CIWA protocol, right pneumothorax, and acute \par hypoxemia all which has now resolved. Pt discharged home with home care services and support of spouse. Initial [facility] visit completed in person. NP [employer] student Lorena Gamez present during time of visit. \par CC "I'm doing great"

## 2021-01-29 NOTE — REASON FOR VISIT
[Follow-Up: _____] : a [unfilled] follow-up visit [Spouse] : spouse [FreeTextEntry1] : FOLLOW YOUR HEART- Transitional Care Management Program- [facility]\par \par

## 2021-01-29 NOTE — PHYSICAL EXAM
[Sclera] : the sclera and conjunctiva were normal [Neck Appearance] : the appearance of the neck was normal [Respiration, Rhythm And Depth] : normal respiratory rhythm and effort [Exaggerated Use Of Accessory Muscles For Inspiration] : no accessory muscle use [Auscultation Breath Sounds / Voice Sounds] : lungs were clear to auscultation bilaterally [Apical Impulse] : the apical impulse was normal [Heart Rate And Rhythm] : heart rate was normal and rhythm regular [Heart Sounds] : normal S1 and S2 [Chest Visual Inspection Thoracic Asymmetry] : no chest asymmetry [FreeTextEntry1] : MSI and CT sites without erythema, drainage or warmth, with edges well approximated. Sternum stable [1+] : left 1+ [FreeTextEntry2] : B/L LE without edema, B/L calves soft, NT [Bowel Sounds] : normal bowel sounds [Abdomen Soft] : soft [Abdomen Tenderness] : non-tender [Abnormal Walk] : normal gait [Musculoskeletal - Swelling] : no joint swelling seen [Skin Color & Pigmentation] : normal skin color and pigmentation [Skin Turgor] : normal skin turgor [] : no rash [Oriented To Time, Place, And Person] : oriented to person, place, and time [Impaired Insight] : insight and judgment were intact [Affect] : the affect was normal

## 2021-01-31 ENCOUNTER — TRANSCRIPTION ENCOUNTER (OUTPATIENT)
Age: 56
End: 2021-01-31

## 2021-02-02 ENCOUNTER — APPOINTMENT (OUTPATIENT)
Dept: RADIOLOGY | Facility: IMAGING CENTER | Age: 56
End: 2021-02-02
Payer: COMMERCIAL

## 2021-02-02 ENCOUNTER — OUTPATIENT (OUTPATIENT)
Dept: OUTPATIENT SERVICES | Facility: HOSPITAL | Age: 56
LOS: 1 days | End: 2021-02-02
Payer: COMMERCIAL

## 2021-02-02 ENCOUNTER — RESULT REVIEW (OUTPATIENT)
Age: 56
End: 2021-02-02

## 2021-02-02 ENCOUNTER — NON-APPOINTMENT (OUTPATIENT)
Age: 56
End: 2021-02-02

## 2021-02-02 DIAGNOSIS — J90 PLEURAL EFFUSION, NOT ELSEWHERE CLASSIFIED: ICD-10-CM

## 2021-02-02 DIAGNOSIS — Z98.890 OTHER SPECIFIED POSTPROCEDURAL STATES: Chronic | ICD-10-CM

## 2021-02-02 PROCEDURE — 71046 X-RAY EXAM CHEST 2 VIEWS: CPT

## 2021-02-02 PROCEDURE — 71046 X-RAY EXAM CHEST 2 VIEWS: CPT | Mod: 26

## 2021-02-03 ENCOUNTER — APPOINTMENT (OUTPATIENT)
Dept: CARDIOTHORACIC SURGERY | Facility: CLINIC | Age: 56
End: 2021-02-03
Payer: COMMERCIAL

## 2021-02-03 VITALS
WEIGHT: 131 LBS | HEART RATE: 82 BPM | SYSTOLIC BLOOD PRESSURE: 125 MMHG | HEIGHT: 68 IN | OXYGEN SATURATION: 97 % | DIASTOLIC BLOOD PRESSURE: 81 MMHG | BODY MASS INDEX: 19.85 KG/M2 | RESPIRATION RATE: 18 BRPM

## 2021-02-03 DIAGNOSIS — E78.00 PURE HYPERCHOLESTEROLEMIA, UNSPECIFIED: ICD-10-CM

## 2021-02-03 PROCEDURE — 99024 POSTOP FOLLOW-UP VISIT: CPT

## 2021-02-03 RX ORDER — OXYCODONE AND ACETAMINOPHEN 5; 325 MG/1; MG/1
5-325 TABLET ORAL
Refills: 0 | Status: COMPLETED | COMMUNITY
Start: 2021-01-22 | End: 2021-02-03

## 2021-02-03 RX ORDER — SENNOSIDES 8.6 MG/1
8.6 TABLET ORAL
Refills: 0 | Status: COMPLETED | COMMUNITY
Start: 2021-01-22 | End: 2021-02-03

## 2021-02-16 ENCOUNTER — NON-APPOINTMENT (OUTPATIENT)
Age: 56
End: 2021-02-16

## 2021-02-23 ENCOUNTER — TRANSCRIPTION ENCOUNTER (OUTPATIENT)
Age: 56
End: 2021-02-23

## 2021-05-03 ENCOUNTER — NON-APPOINTMENT (OUTPATIENT)
Age: 56
End: 2021-05-03

## 2022-02-03 ENCOUNTER — OUTPATIENT (OUTPATIENT)
Dept: OUTPATIENT SERVICES | Facility: HOSPITAL | Age: 57
LOS: 1 days | End: 2022-02-03
Payer: COMMERCIAL

## 2022-02-03 ENCOUNTER — APPOINTMENT (OUTPATIENT)
Dept: CT IMAGING | Facility: CLINIC | Age: 57
End: 2022-02-03
Payer: COMMERCIAL

## 2022-02-03 DIAGNOSIS — I77.810 THORACIC AORTIC ECTASIA: ICD-10-CM

## 2022-02-03 DIAGNOSIS — Z98.890 OTHER SPECIFIED POSTPROCEDURAL STATES: Chronic | ICD-10-CM

## 2022-02-03 DIAGNOSIS — I35.0 NONRHEUMATIC AORTIC (VALVE) STENOSIS: ICD-10-CM

## 2022-02-03 DIAGNOSIS — Z95.2 PRESENCE OF PROSTHETIC HEART VALVE: ICD-10-CM

## 2022-02-03 PROCEDURE — 71275 CT ANGIOGRAPHY CHEST: CPT

## 2022-02-03 PROCEDURE — 71275 CT ANGIOGRAPHY CHEST: CPT | Mod: 26

## 2022-02-10 ENCOUNTER — APPOINTMENT (OUTPATIENT)
Dept: CARDIOTHORACIC SURGERY | Facility: CLINIC | Age: 57
End: 2022-02-10
Payer: COMMERCIAL

## 2022-02-10 VITALS
TEMPERATURE: 97.7 F | SYSTOLIC BLOOD PRESSURE: 155 MMHG | DIASTOLIC BLOOD PRESSURE: 89 MMHG | RESPIRATION RATE: 16 BRPM | OXYGEN SATURATION: 96 % | BODY MASS INDEX: 20.46 KG/M2 | HEIGHT: 68 IN | WEIGHT: 135 LBS | HEART RATE: 73 BPM

## 2022-02-10 PROCEDURE — 99214 OFFICE O/P EST MOD 30 MIN: CPT

## 2022-02-10 RX ORDER — METOPROLOL TARTRATE 25 MG/1
25 TABLET, FILM COATED ORAL
Refills: 0 | Status: COMPLETED | COMMUNITY
Start: 2021-01-22 | End: 2022-02-10

## 2022-02-10 RX ORDER — FOLIC ACID 1 MG/1
1 TABLET ORAL
Refills: 0 | Status: COMPLETED | COMMUNITY
Start: 2021-01-22 | End: 2022-02-10

## 2022-02-10 RX ORDER — MULTIVIT-MIN/IRON FUM/FOLIC AC 7.5 MG-4
TABLET ORAL
Refills: 0 | Status: COMPLETED | COMMUNITY
Start: 2021-01-22 | End: 2022-02-10

## 2022-02-10 RX ORDER — AMLODIPINE BESYLATE 5 MG/1
5 TABLET ORAL DAILY
Refills: 0 | Status: COMPLETED | COMMUNITY
Start: 2021-01-22 | End: 2022-02-10

## 2022-02-10 NOTE — ASSESSMENT
[FreeTextEntry1] : I had the pleasure of seeing Mr. CUMMINGS in the office today to discuss\par \par Briefly, this is a 56 year male, 1 year status post aortic valve replacement for bicuspid aortic valve disease.  I have reviewed the patient's operative report.  At the time of surgery the aorta measured 4.0 cm.  I rated the tissue quality as good.  I was therefore comfortable not replacing the ascending aorta.  In retrospect I am glad I am limited his surgery secondary to postoperative delirium tremens.  Follow-up CT scan shows ascending aorta measures 4.2 cm in maximal diameter.  He is hypertensive in the office today.  He is not taking an aspirin.\par \par Plan: \par 1.  I recommend aspirin 81 mg daily for anyone in the setting of a biologic valve.\par 2.  Repeat CT angiogram 1 year from now after which the patient will return for repeat evaluation.\par 3.  The patient is hypertensive in the office today.  There is data to suggest that selecting inhibition of the angiotensin I receptor, while preserving angiotensin II receptor signaling may be protective in this patient population.  This affect is at the subcellular level and independent of blood pressure affect.  The addition of a low-dose ARB may therefore be advantageous.\par \par I would like to thank you for referring this patient to my attention and for allowing me to participate in his care.  If there are any further questions, or I can be of any further assistance, please do not hesitate contacting me at any time.

## 2022-02-10 NOTE — PHYSICAL EXAM
[Jugular Venous Distention Increased] : there was no jugular-venous distention [Respiration, Rhythm And Depth] : normal respiratory rhythm and effort [Auscultation Breath Sounds / Voice Sounds] : lungs were clear to auscultation bilaterally [Heart Rate And Rhythm] : heart rate was normal and rhythm regular [Heart Sounds] : normal S1 and S2 [2+] : right 2+ [1+] : left 1+ [Bowel Sounds] : normal bowel sounds [Abdomen Soft] : soft [Skin Color & Pigmentation] : normal skin color and pigmentation [No Focal Deficits] : no focal deficits [Oriented To Time, Place, And Person] : oriented to person, place, and time [Impaired Insight] : insight and judgment were intact [Affect] : the affect was normal [Mood] : the mood was normal [Varicose Veins Of The Right Leg] : the patient has no varicose veins of the right leg [Varicose Veins Of The Left Leg] : the patient has no varicose veins of the left leg [No CVA Tenderness] : no ~M costovertebral angle tenderness [No Spinal Tenderness] : no spinal tenderness [Abnormal Walk] : normal gait [Nail Clubbing] : no clubbing  or cyanosis of the fingernails [Musculoskeletal - Swelling] : no joint swelling seen [Motor Tone] : muscle strength and tone were normal [Skin Turgor] : normal skin turgor [] : no rash

## 2022-02-10 NOTE — DATA REVIEWED
[FreeTextEntry1] : CTA Chest\par 2/3/22\par \par \par IMPRESSION:\par Interval aortic valve replacement. Stable mid ascending aorta measuring 4.2 cm. No thoracic aortic dissection.\par \par

## 2022-02-10 NOTE — HISTORY OF PRESENT ILLNESS
[FreeTextEntry1] : Mr. EVELIO CUMMINGS is a 56 year male who presents today for followup for ascending aorta measurements in the setting of bicupsid valve congenital abnormality. Previously, he has been followed for aortic valve replacement on 1/15/2021 for bicuspid aortic valve.\par \par Additional past medical history includes alcohol abuse, right pneumothorax and acute hypoxemia. Patient had COVID January 2022.\par \par Today, the patient reports decreased energy levels last spring although they seem to be a little better. He denies shortness of breath or chest pain. He continues to work as an . \par \par Cardio: Dr. Monson\par \par Patient is here to review surveillance Chest CT results.  CT angiogram February 3, 2022 was personally reviewed.  The ascending aorta measures 4.2 cm at the level of the pulmonary artery.  Sinus measurements are 34 x 34 x 36 mm.  Sinotubular junction likewise measures 34 mm.\par \par The patient is stopped all medications including aspirin.  He is fully aware that he requires antibiotics prior to routine dental procedures.  Today in the office he is mildly hypertensive.\par \par The patient's past medical history, past surgical history, family history, social history, allergies, medications, and multisystem review of systems were individually reviewed with the patient.  There are no pertinent additions or subtractions.  The patient was personally seen and examined.

## 2024-03-27 ENCOUNTER — OUTPATIENT (OUTPATIENT)
Dept: OUTPATIENT SERVICES | Facility: HOSPITAL | Age: 59
LOS: 1 days | End: 2024-03-27
Payer: COMMERCIAL

## 2024-03-27 ENCOUNTER — APPOINTMENT (OUTPATIENT)
Dept: CT IMAGING | Facility: CLINIC | Age: 59
End: 2024-03-27
Payer: COMMERCIAL

## 2024-03-27 DIAGNOSIS — I35.0 NONRHEUMATIC AORTIC (VALVE) STENOSIS: ICD-10-CM

## 2024-03-27 DIAGNOSIS — I77.810 THORACIC AORTIC ECTASIA: ICD-10-CM

## 2024-03-27 DIAGNOSIS — Z98.890 OTHER SPECIFIED POSTPROCEDURAL STATES: Chronic | ICD-10-CM

## 2024-03-27 DIAGNOSIS — Z95.2 PRESENCE OF PROSTHETIC HEART VALVE: ICD-10-CM

## 2024-03-27 PROCEDURE — 71275 CT ANGIOGRAPHY CHEST: CPT

## 2024-03-27 PROCEDURE — 71275 CT ANGIOGRAPHY CHEST: CPT | Mod: 26

## 2024-04-05 ENCOUNTER — APPOINTMENT (OUTPATIENT)
Dept: CARDIOTHORACIC SURGERY | Facility: CLINIC | Age: 59
End: 2024-04-05
Payer: COMMERCIAL

## 2024-04-05 VITALS
BODY MASS INDEX: 21.22 KG/M2 | HEART RATE: 63 BPM | OXYGEN SATURATION: 97 % | RESPIRATION RATE: 16 BRPM | TEMPERATURE: 97.7 F | SYSTOLIC BLOOD PRESSURE: 135 MMHG | DIASTOLIC BLOOD PRESSURE: 85 MMHG | HEIGHT: 68 IN | WEIGHT: 140 LBS

## 2024-04-05 DIAGNOSIS — F17.290 NICOTINE DEPENDENCE, OTHER TOBACCO PRODUCT, UNCOMPLICATED: ICD-10-CM

## 2024-04-05 DIAGNOSIS — I77.810 THORACIC AORTIC ECTASIA: ICD-10-CM

## 2024-04-05 DIAGNOSIS — Z95.2 PRESENCE OF PROSTHETIC HEART VALVE: ICD-10-CM

## 2024-04-05 DIAGNOSIS — I35.0 NONRHEUMATIC AORTIC (VALVE) STENOSIS: ICD-10-CM

## 2024-04-05 PROCEDURE — 99213 OFFICE O/P EST LOW 20 MIN: CPT

## 2024-04-05 RX ORDER — ASPIRIN ENTERIC COATED TABLETS 81 MG 81 MG/1
81 TABLET, DELAYED RELEASE ORAL
Refills: 0 | Status: ACTIVE | COMMUNITY
Start: 2024-04-05

## 2024-04-05 NOTE — ASSESSMENT
[FreeTextEntry1] : I had the pleasure of seeing Mr. EVELIO CUMMINGS in the office today to evaluate the status of his aortic aneurysm.  Briefly, this is a 58 year old male with a pertinent medical history of an aortic valve replacement in 2021 who has been followed by our office for an ascending aortic aneurysm since 2021. All imaging studies and tests have been thoroughly reviewed.  Today we discussed the disease process with regards to Mr. CUMMINGS's ascending aortic aneurysm. This is not something that we can change or reduce in size medically but something that we monitor for the long term, assessing for growth in total size and rate of change.   The current measurement of this patient's ascending aorta is 4.3 cm, which we do not yet consider an indication for surgery. Surgery is generally indicated when the aortic size is closer to 5 cm (history of bicuspid valve).  Protective strategies were discussed with Mr. CUMMINGS include close blood pressure management and prevention of very heavy or prolonged lifting.   Use of a medication within the class of angiotensin receptor blockers, such as Losartan, should be considered for patients with an aortic aneurysm. In addition to anti-hypertensive effects, studies show that it also works on a subcellular level to slow the rate of aortic dilation.   We will be continuing the use of the aortic registry and he is to follow up with the next surveillance CT scan chest with IV contrast in 2 years. The patient was in full understanding of and in agreement with plan going forward. All questions answered and concerns were addressed.  Plan: -  Recommend ARB - CTA chest in 2 years - Aortic registry - Return to care in our office after follow up imaging completed - Cardiology follow-up with Dr. Ghazal HOLDER, Dr. Carlos Enrique Cruz, personally performed the evaluation and management (E/M) services for this established patient who presents today with an existing condition.  That E/M includes conducting the examination, assessing all conditions, and establishing a new plan of care.  Today, Linda Vaughn PA-C, was here to observe my evaluation and management services for this/these condition(s) to be followed going forward.

## 2024-04-05 NOTE — PHYSICAL EXAM
[Sclera] : the sclera and conjunctiva were normal [Neck Appearance] : the appearance of the neck was normal [] : no respiratory distress [Respiration, Rhythm And Depth] : normal respiratory rhythm and effort [Auscultation Breath Sounds / Voice Sounds] : lungs were clear to auscultation bilaterally [Heart Rate And Rhythm] : heart rate was normal and rhythm regular [Bowel Sounds] : normal bowel sounds [Abdomen Soft] : soft [Abnormal Walk] : normal gait [Skin Color & Pigmentation] : normal skin color and pigmentation [No Focal Deficits] : no focal deficits [Oriented To Time, Place, And Person] : oriented to person, place, and time [Impaired Insight] : insight and judgment were intact [FreeTextEntry1] : systolic murmur [FreeTextEntry2] : no edema

## 2024-04-05 NOTE — HISTORY OF PRESENT ILLNESS
[FreeTextEntry1] : Mr. EVELIO CUMMINGS is a 58 year old male who presents today for aortic registry follow-up. We have been monitoring his ascending aortic aneurysm since undergoing aortic valve replacement surgery in January 2021 for a bicuspid aortic valve. The aortic measurement on previous scan 2/3/2022 was 4.2 cm at the mid ascending aorta.    Pertinent past medical history includes alcohol abuse, right pneumothorax and acute hypoxemia. Patient had COVID January 2022.   Today he reports feeling well overall. Patient denies chest pain, palpitations, shortness of breath, cough, fevers, chills, fatigue and unintentional weight loss or gain.    The patient denies family history of thoracic aortic aneurysm, unexplained sudden death, aortic dissection, bicuspid aortic valve disease or connective tissue disorder.  Cardiologist: Ghazal  The patient's past medical history, past surgical history, family history, social history, allergies, medications, and multisystem review of systems were individually reviewed with the patient. There are no pertinent additions or subtractions. The patient was personally seen and examined.

## 2024-04-05 NOTE — DATA REVIEWED
[FreeTextEntry1] : CTA of the chest from PROCEDURE DATE:  03/27/2024 INTERPRETATION:  INDICATION: Thoracic aortic ectasia  TECHNIQUE: Angiogram of the chest before and after the administration of 90 mL of Omnipaque 350.  Three-dimensional images were constructed at an independent workstation.  COMPARISON: CT chest 2/3/2022.  FINDINGS:  HEART/VASCULATURE: Normal heart size. No pericardial effusion. Aortic valve replacement. No dissection. Mild calcified and noncalcified plaque within the aorta. Patent arch vessels.  Aortic measurements (longest short axis diameter): Sinuses of Valsalva, cusp to commissure: 3.6 x 3.4 x 3.6 cm Sinotubular junction: 3.4 cm Mid ascending aorta: 4.3 cm Distal ascending aorta proximal to the brachiocephalic artery: 3.3 cm Aortic arch, proximal to the left subclavian artery: 2.4 cm Isthmus: 2.5 cm Mid descending aorta at the level of the left pulmonary artery: 2.6 cm Mid descending aorta at the level of the left inferior pulmonary vein: 2.4 cm At the hiatus: 2.5 cm  LUNGS/AIRWAYS/PLEURA: Emphysema with bullous components. No endobronchial lesion. No pleural effusion.  LYMPH NODES/MEDIASTINUM: No lymphadenopathy.  UPPER ABDOMEN: Left renal cyst.  BONES/SOFT TISSUES: Sternotomy.    IMPRESSION:  Aortic valve replacement. 4.3 cm ascending aorta, unchanged from prior allowing for technical differences.